# Patient Record
Sex: FEMALE | Race: WHITE | Employment: FULL TIME | URBAN - METROPOLITAN AREA
[De-identification: names, ages, dates, MRNs, and addresses within clinical notes are randomized per-mention and may not be internally consistent; named-entity substitution may affect disease eponyms.]

---

## 2020-05-09 ENCOUNTER — ED HISTORICAL/CONVERTED ENCOUNTER (OUTPATIENT)
Dept: OTHER | Age: 35
End: 2020-05-09

## 2021-03-05 LAB
ANTIBODY SCREEN, EXTERNAL: NEGATIVE
CHLAMYDIA, EXTERNAL: NEGATIVE
HBSAG, EXTERNAL: NEGATIVE
HIV, EXTERNAL: NEGATIVE
N. GONORRHEA, EXTERNAL: NEGATIVE
RPR, EXTERNAL: NORMAL
RUBELLA, EXTERNAL: NORMAL
TYPE, ABO & RH, EXTERNAL: NORMAL

## 2021-04-05 LAB — GTT, 1 HR, GLUCOLA, EXTERNAL: 145

## 2021-05-15 ENCOUNTER — HOSPITAL ENCOUNTER (EMERGENCY)
Age: 36
Discharge: HOME | End: 2021-05-15
Attending: EMERGENCY MEDICINE
Payer: MEDICAID

## 2021-05-15 ENCOUNTER — APPOINTMENT (OUTPATIENT)
Dept: ULTRASOUND IMAGING | Age: 36
End: 2021-05-15
Attending: EMERGENCY MEDICINE
Payer: MEDICAID

## 2021-05-15 ENCOUNTER — APPOINTMENT (OUTPATIENT)
Dept: GENERAL RADIOLOGY | Age: 36
End: 2021-05-15
Attending: EMERGENCY MEDICINE
Payer: MEDICAID

## 2021-05-15 VITALS
WEIGHT: 245 LBS | RESPIRATION RATE: 20 BRPM | DIASTOLIC BLOOD PRESSURE: 91 MMHG | HEART RATE: 82 BPM | SYSTOLIC BLOOD PRESSURE: 156 MMHG | HEIGHT: 65 IN | TEMPERATURE: 98.6 F | OXYGEN SATURATION: 100 % | BODY MASS INDEX: 40.82 KG/M2

## 2021-05-15 DIAGNOSIS — D72.829 LEUKOCYTOSIS, UNSPECIFIED TYPE: ICD-10-CM

## 2021-05-15 DIAGNOSIS — K52.9 GASTROENTERITIS: Primary | ICD-10-CM

## 2021-05-15 LAB
ALBUMIN SERPL-MCNC: 3.3 G/DL (ref 3.5–5)
ALBUMIN/GLOB SERPL: 0.7 {RATIO} (ref 1.1–2.2)
ALP SERPL-CCNC: 129 U/L (ref 45–117)
ALT SERPL-CCNC: 19 U/L (ref 12–78)
ANION GAP SERPL CALC-SCNC: 9 MMOL/L (ref 5–15)
APPEARANCE UR: ABNORMAL
AST SERPL W P-5'-P-CCNC: 12 U/L (ref 15–37)
BACTERIA URNS QL MICRO: NEGATIVE /HPF
BASOPHILS # BLD: 0.1 K/UL (ref 0–0.1)
BASOPHILS NFR BLD: 0 % (ref 0–1)
BILIRUB SERPL-MCNC: 0.2 MG/DL (ref 0.2–1)
BILIRUB UR QL: NEGATIVE
BUN SERPL-MCNC: 6 MG/DL (ref 6–20)
BUN/CREAT SERPL: 13 (ref 12–20)
CA-I BLD-MCNC: 9.5 MG/DL (ref 8.5–10.1)
CHLORIDE SERPL-SCNC: 103 MMOL/L (ref 97–108)
CO2 SERPL-SCNC: 22 MMOL/L (ref 21–32)
COLOR UR: ABNORMAL
CREAT SERPL-MCNC: 0.48 MG/DL (ref 0.55–1.02)
DIFFERENTIAL METHOD BLD: ABNORMAL
EOSINOPHIL # BLD: 0 K/UL (ref 0–0.4)
EOSINOPHIL NFR BLD: 0 % (ref 0–7)
ERYTHROCYTE [DISTWIDTH] IN BLOOD BY AUTOMATED COUNT: 13.6 % (ref 11.5–14.5)
GLOBULIN SER CALC-MCNC: 4.7 G/DL (ref 2–4)
GLUCOSE SERPL-MCNC: 122 MG/DL (ref 65–100)
GLUCOSE UR STRIP.AUTO-MCNC: NEGATIVE MG/DL
HCG SERPL-ACNC: 6691 MIU/ML (ref 0–6)
HCT VFR BLD AUTO: 38.3 % (ref 35–47)
HGB BLD-MCNC: 12.7 G/DL (ref 11.5–16)
HGB UR QL STRIP: ABNORMAL
IMM GRANULOCYTES # BLD AUTO: 0.2 K/UL (ref 0–0.04)
IMM GRANULOCYTES NFR BLD AUTO: 1 % (ref 0–0.5)
KETONES UR QL STRIP.AUTO: 20 MG/DL
LACTATE SERPL-SCNC: 1.1 MMOL/L (ref 0.4–2)
LEUKOCYTE ESTERASE UR QL STRIP.AUTO: NEGATIVE
LIPASE SERPL-CCNC: 62 U/L (ref 73–393)
LYMPHOCYTES # BLD: 2.1 K/UL (ref 0.8–3.5)
LYMPHOCYTES NFR BLD: 6 % (ref 12–49)
MAGNESIUM SERPL-MCNC: 1.5 MG/DL (ref 1.6–2.4)
MCH RBC QN AUTO: 29.7 PG (ref 26–34)
MCHC RBC AUTO-ENTMCNC: 33.2 G/DL (ref 30–36.5)
MCV RBC AUTO: 89.5 FL (ref 80–99)
MONOCYTES # BLD: 1.4 K/UL (ref 0–1)
MONOCYTES NFR BLD: 4 % (ref 5–13)
MUCOUS THREADS URNS QL MICRO: ABNORMAL /LPF
NEUTS SEG # BLD: 29.1 K/UL (ref 1.8–8)
NEUTS SEG NFR BLD: 89 % (ref 32–75)
NITRITE UR QL STRIP.AUTO: NEGATIVE
NRBC # BLD: 0 K/UL (ref 0–0.01)
NRBC BLD-RTO: 0 PER 100 WBC
PH UR STRIP: 5 [PH] (ref 5–8)
PLATELET # BLD AUTO: 416 K/UL (ref 150–400)
PMV BLD AUTO: 11 FL (ref 8.9–12.9)
POTASSIUM SERPL-SCNC: 3.6 MMOL/L (ref 3.5–5.1)
PROT SERPL-MCNC: 8 G/DL (ref 6.4–8.2)
PROT UR STRIP-MCNC: 100 MG/DL
RBC # BLD AUTO: 4.28 M/UL (ref 3.8–5.2)
RBC #/AREA URNS HPF: ABNORMAL /HPF (ref 0–5)
SODIUM SERPL-SCNC: 134 MMOL/L (ref 136–145)
SP GR UR REFRACTOMETRY: 1.03 (ref 1–1.03)
TROPONIN I SERPL-MCNC: <0.05 NG/ML
UROBILINOGEN UR QL STRIP.AUTO: 0.1 EU/DL (ref 0.1–1)
WBC # BLD AUTO: 32.9 K/UL (ref 3.6–11)
WBC URNS QL MICRO: ABNORMAL /HPF (ref 0–4)

## 2021-05-15 PROCEDURE — 96361 HYDRATE IV INFUSION ADD-ON: CPT

## 2021-05-15 PROCEDURE — 74011000258 HC RX REV CODE- 258: Performed by: EMERGENCY MEDICINE

## 2021-05-15 PROCEDURE — 93005 ELECTROCARDIOGRAM TRACING: CPT

## 2021-05-15 PROCEDURE — 74011250636 HC RX REV CODE- 250/636: Performed by: NURSE PRACTITIONER

## 2021-05-15 PROCEDURE — 36415 COLL VENOUS BLD VENIPUNCTURE: CPT

## 2021-05-15 PROCEDURE — 84702 CHORIONIC GONADOTROPIN TEST: CPT

## 2021-05-15 PROCEDURE — 71045 X-RAY EXAM CHEST 1 VIEW: CPT

## 2021-05-15 PROCEDURE — 74011250636 HC RX REV CODE- 250/636: Performed by: EMERGENCY MEDICINE

## 2021-05-15 PROCEDURE — 83690 ASSAY OF LIPASE: CPT

## 2021-05-15 PROCEDURE — 76815 OB US LIMITED FETUS(S): CPT

## 2021-05-15 PROCEDURE — 80053 COMPREHEN METABOLIC PANEL: CPT

## 2021-05-15 PROCEDURE — 84484 ASSAY OF TROPONIN QUANT: CPT

## 2021-05-15 PROCEDURE — 96375 TX/PRO/DX INJ NEW DRUG ADDON: CPT

## 2021-05-15 PROCEDURE — 81001 URINALYSIS AUTO W/SCOPE: CPT

## 2021-05-15 PROCEDURE — 74011250637 HC RX REV CODE- 250/637: Performed by: EMERGENCY MEDICINE

## 2021-05-15 PROCEDURE — 99285 EMERGENCY DEPT VISIT HI MDM: CPT

## 2021-05-15 PROCEDURE — 83735 ASSAY OF MAGNESIUM: CPT

## 2021-05-15 PROCEDURE — 85025 COMPLETE CBC W/AUTO DIFF WBC: CPT

## 2021-05-15 PROCEDURE — 87040 BLOOD CULTURE FOR BACTERIA: CPT

## 2021-05-15 PROCEDURE — 83605 ASSAY OF LACTIC ACID: CPT

## 2021-05-15 PROCEDURE — 96374 THER/PROPH/DIAG INJ IV PUSH: CPT

## 2021-05-15 RX ORDER — ACETAMINOPHEN 500 MG
1000 TABLET ORAL
Status: COMPLETED | OUTPATIENT
Start: 2021-05-15 | End: 2021-05-15

## 2021-05-15 RX ORDER — ACETAMINOPHEN 325 MG/1
650 TABLET ORAL ONCE
Status: COMPLETED | OUTPATIENT
Start: 2021-05-15 | End: 2021-05-15

## 2021-05-15 RX ORDER — ONDANSETRON 2 MG/ML
4 INJECTION INTRAMUSCULAR; INTRAVENOUS
Status: COMPLETED | OUTPATIENT
Start: 2021-05-15 | End: 2021-05-15

## 2021-05-15 RX ORDER — ONDANSETRON 4 MG/1
4 TABLET, ORALLY DISINTEGRATING ORAL
Qty: 12 TAB | Refills: 0 | Status: SHIPPED | OUTPATIENT
Start: 2021-05-15 | End: 2021-05-20

## 2021-05-15 RX ADMIN — ACETAMINOPHEN 650 MG: 325 TABLET, FILM COATED ORAL at 11:06

## 2021-05-15 RX ADMIN — SODIUM CHLORIDE 1000 ML: 9 INJECTION, SOLUTION INTRAVENOUS at 02:30

## 2021-05-15 RX ADMIN — ONDANSETRON 4 MG: 2 INJECTION INTRAMUSCULAR; INTRAVENOUS at 05:01

## 2021-05-15 RX ADMIN — ACETAMINOPHEN 1000 MG: 500 TABLET, FILM COATED ORAL at 05:01

## 2021-05-15 RX ADMIN — PIPERACILLIN AND TAZOBACTAM 3.38 G: 3; .375 INJECTION, POWDER, LYOPHILIZED, FOR SOLUTION INTRAVENOUS at 07:41

## 2021-05-15 NOTE — ED PROVIDER NOTES
EMERGENCY DEPARTMENT HISTORY AND PHYSICAL EXAM      Date: 5/15/2021  Patient Name: aTmara Colby      History of Presenting Illness     Chief Complaint   Patient presents with    Chest Pain    Vomiting    Diarrhea       History Provided By: Patient    HPI: Tamara Colby, 30 y.o. female with a past medical history significant No significant past medical history presents to the ED with cc of chest pain and upper back pain over the past 24 hours. Patient is approximately 19 weeks pregnant. Patient has nausea vomiting. No shortness of breath. No vaginal discharge or bleed. No abdominal pain. Patient also complains of mid back pain. She has said the pain gets worse upon movement. Patient denies any other complaints at this time. There are no other complaints, changes, or physical findings at this time. PCP: James Fill, DO    Current Outpatient Medications   Medication Sig Dispense Refill    ondansetron (Zofran ODT) 4 mg disintegrating tablet Take 1 Tab by mouth every eight (8) hours as needed for Nausea or Vomiting for up to 5 days. 12 Tab 0       Past History     Past Medical History:  No past medical history on file. Past Surgical History:  No past surgical history on file. Family History:  No family history on file. Social History:  Social History     Tobacco Use    Smoking status: Not on file   Substance Use Topics    Alcohol use: Not on file    Drug use: Not on file       Allergies: Allergies   Allergen Reactions    Sulfa (Sulfonamide Antibiotics) Not Reported This Time         Review of Systems     Review of Systems   Constitutional: Negative. HENT: Negative. Eyes: Negative. Respiratory: Negative. Cardiovascular: Positive for chest pain. Gastrointestinal: Negative. Endocrine: Negative. Genitourinary: Negative. Musculoskeletal: Negative. Skin: Negative. Neurological: Negative. Psychiatric/Behavioral: Negative.     All other systems reviewed and are negative. Physical Exam     Physical Exam  Vitals signs and nursing note reviewed. Constitutional:       General: She is not in acute distress. Appearance: Normal appearance. She is obese. She is not ill-appearing or diaphoretic. HENT:      Head: Normocephalic. Right Ear: External ear normal.      Left Ear: External ear normal.      Nose: Nose normal. No congestion or rhinorrhea. Mouth/Throat:      Pharynx: Oropharynx is clear. No oropharyngeal exudate or posterior oropharyngeal erythema. Eyes:      General: No scleral icterus. Right eye: No discharge. Left eye: No discharge. Extraocular Movements: Extraocular movements intact. Conjunctiva/sclera: Conjunctivae normal.      Pupils: Pupils are equal, round, and reactive to light. Neck:      Musculoskeletal: Normal range of motion and neck supple. No neck rigidity or muscular tenderness. Cardiovascular:      Rate and Rhythm: Normal rate and regular rhythm. Pulses: Normal pulses. Heart sounds: Normal heart sounds. No murmur. Pulmonary:      Effort: Pulmonary effort is normal. No respiratory distress. Breath sounds: Normal breath sounds. No stridor. No decreased breath sounds, wheezing, rhonchi or rales. Chest:      Chest wall: No tenderness. Comments: Moderate chest wall tenderness upon palpation at the lower chest wall anteriorly and over sternum. Abdominal:      General: Abdomen is flat. Bowel sounds are normal. There is no distension. Palpations: Abdomen is soft. Tenderness: There is no abdominal tenderness. There is no right CVA tenderness, left CVA tenderness, guarding or rebound. Comments: Gravid uterus   Musculoskeletal:         General: No swelling, tenderness, deformity or signs of injury. Right lower leg: No edema. Left lower leg: No edema. Comments: Positive tenderness mild muscle spasm upon palpation   Skin:     General: Skin is warm. Capillary Refill: Capillary refill takes less than 2 seconds. Coloration: Skin is not jaundiced or pale. Findings: No bruising, erythema, lesion or rash. Neurological:      General: No focal deficit present. Mental Status: She is alert and oriented to person, place, and time. Mental status is at baseline. Cranial Nerves: No cranial nerve deficit. Sensory: No sensory deficit. Motor: No weakness. Coordination: Coordination normal.   Psychiatric:         Mood and Affect: Mood normal.         Behavior: Behavior normal.         Thought Content: Thought content normal.         Judgment: Judgment normal.         Lab and Diagnostic Study Results     Labs -     No results found for this or any previous visit (from the past 12 hour(s)). Radiologic Studies -   [unfilled]  CT Results  (Last 48 hours)    None        CXR Results  (Last 48 hours)               05/15/21 0543  XR CHEST PORT Final result    Impression:  Borderline enlargement of cardiopericardial silhouette, magnified by   exam technique. No evidence of pulmonary edema, air space pneumonia, or pleural   effusion. No evidence of pneumothorax. Narrative:  Chest, portable, 0537 hours. Comparison with 9/1/2012. Medical Decision Making and ED Course   - I am the first and primary provider for this patient AND AM THE PRIMARY PROVIDER OF RECORD. - I reviewed the vital signs, available nursing notes, past medical history, past surgical history, family history and social history. - Initial assessment performed. The patients presenting problems have been discussed, and the staff are in agreement with the care plan formulated and outlined with them. I have encouraged them to ask questions as they arise throughout their visit. Vital Signs-Reviewed the patient's vital signs. No data found. EKG interpretation: (Preliminary):EKG was done at 2:06 AM.  Normal sinus rhythm. Rate of 82. Normal axis.   No acute ST-T elevation. No STEMI. No old EKG available for comparison. Records Reviewed: Nursing Notes        ED Course:       ED Course as of May 16 0722   Sat May 15, 2021   1128 I reevaluated the patient. She is feeling well. Ultrasound is unremarkable showing pregnancy at 20 weeks and 3 days. I spoke with the on-call OB/GYN doctor, Dr. Deya Bustos. She suspects likely gastroenteritis or gastritis. I feel it is safe to discharge patient home and feel that her leukocytosis is slightly out of proportion due to her pregnancy. Feel that CT scan of the abdomen and pelvis would do more harm than good. [SW]      ED Course User Index  [SW] Bernsal Collar, DO         Provider Notes (Medical Decision Making):     MDM  Number of Diagnoses or Management Options  Gastroenteritis: new, needed workup  Leukocytosis, unspecified type: new, needed workup  Diagnosis management comments: Differential diagnosis include discomfort of pregnancy, GERD, gastritis, peptic ulcer disease, hiatal hernia chest pain, musculoskeletal pain, pneumonia, pneumothorax. Amount and/or Complexity of Data Reviewed  Clinical lab tests: ordered and reviewed  Tests in the radiology section of CPT®: ordered and reviewed  Tests in the medicine section of CPT®: ordered and reviewed  Independent visualization of images, tracings, or specimens: yes (EKG was done at 2:06 AM.  Normal sinus rhythm. Rate of 82. Normal axis. No acute ST-T elevation. No STEMI. No old EKG available for comparison.)    Risk of Complications, Morbidity, and/or Mortality  Presenting problems: high  Diagnostic procedures: high  Management options: high  General comments: Patient remained stable throughout the course of treatment. Labs were unremarkable except for leukocytosis of 32,000. Ultrasound still is pending.   Case was discussed and transferred to Dr. Frankie Kidd, incoming ER physician at 9 AM.               Consultations:       Consultations:         Procedures and Critical Care       Performed by: Harsh Traylor DO  PROCEDURES:  Procedures               CRITICAL CARE NOTE :  4:52 AM  Amount of Critical Care Time: (minutes)    IMPENDING DETERIORATION -  ASSOCIATED RISK FACTORS -   MANAGEMENT-   INTERPRETATION -    INTERVENTIONS -   CASE REVIEW -   TREATMENT RESPONSE -  PERFORMED BY -     NOTES   :  I have spent critical care time involved in lab review, consultations with specialist, family decision- making, bedside attention and documentation. This time excludes time spent in any separate billed procedures. During this entire length of time I was immediately available to the patient . Harsh Traylor DO        Disposition     Disposition: Condition stable    Discharged    Remove if not discharged  DISCHARGE PLAN:  1. There are no discharge medications for this patient. 2.   Follow-up Information     Follow up With Specialties Details Why [address] [facility] EMERGENCY DEPT Emergency Medicine Go today As soon as possible if symptoms worsen [facility] [place] [id number]  [phone]    Your OB/GYN            3. Return to ED if worse   4. Discharge Medication List as of 5/15/2021 11:56 AM      START taking these medications    Details   ondansetron (Zofran ODT) 4 mg disintegrating tablet Take 1 Tab by mouth every eight (8) hours as needed for Nausea or Vomiting for up to 5 days. , Normal, Disp-12 Tab, R-0             Diagnosis     Clinical Impression:   1. Gastroenteritis    2. Leukocytosis, unspecified type        Attestations:    Harsh Traylor DO    Please note that this dictation was completed with [employer], the computer voice recognition software. Quite often unanticipated grammatical, syntax, homophones, and other interpretive errors are inadvertently transcribed by the computer software. Please disregard these errors. Please excuse any errors that have escaped final proofreading. Thank you.

## 2021-05-15 NOTE — ED NOTES
33yo female pt with significant PMH of being 19weeks pregnant presents to ED for nausea, emesis, diarrhea, oriented to room and call bell, cardiac and continuous spO2 monitoring initiated, IV started, blood samples and urine samples collected and sent to lab for analysis, plan of care reviewed, call bell in reach, side rails up x2, will continue to monitor.

## 2021-05-15 NOTE — ED TRIAGE NOTES
Pt 19 weeks pregnant. Reports back pain/chest pain that started this morning. Reports vomiting for approx 24 hours with diarrhea. +fetal movement. Denies abd cramping.

## 2021-05-16 LAB
ATRIAL RATE: 82 BPM
CALCULATED P AXIS, ECG09: 59 DEGREES
CALCULATED R AXIS, ECG10: 46 DEGREES
CALCULATED T AXIS, ECG11: 39 DEGREES
DIAGNOSIS, 93000: NORMAL
P-R INTERVAL, ECG05: 154 MS
Q-T INTERVAL, ECG07: 396 MS
QRS DURATION, ECG06: 84 MS
QTC CALCULATION (BEZET), ECG08: 462 MS
VENTRICULAR RATE, ECG03: 82 BPM

## 2021-05-19 LAB
GTT 120 MIN, EXTERNAL: 150
GTT 180 MIN, EXTERNAL: 48
GTT 60 MIN, EXTERNAL: 138
GTT, FASTING, EXTERNAL: 79

## 2021-05-21 LAB
BACTERIA SPEC CULT: NORMAL
SPECIAL REQUESTS,SREQ: NORMAL

## 2021-06-21 ENCOUNTER — HOSPITAL ENCOUNTER (OUTPATIENT)
Dept: PERINATAL CARE | Age: 36
Discharge: HOME | End: 2021-06-21
Attending: OBSTETRICS & GYNECOLOGY
Payer: MEDICAID

## 2021-06-21 PROCEDURE — 76811 OB US DETAILED SNGL FETUS: CPT | Performed by: OBSTETRICS & GYNECOLOGY

## 2021-09-03 LAB — GRBS, EXTERNAL: NEGATIVE

## 2021-09-17 ENCOUNTER — HOSPITAL ENCOUNTER (OUTPATIENT)
Dept: LAB | Age: 36
Discharge: HOME | DRG: 540 | End: 2021-09-17
Payer: MEDICAID

## 2021-09-17 ENCOUNTER — TRANSCRIBE ORDER (OUTPATIENT)
Dept: REGISTRATION | Age: 36
End: 2021-09-17

## 2021-09-17 DIAGNOSIS — Z01.812 ENCOUNTER FOR PREOPERATIVE SCREENING LABORATORY TESTING FOR COVID-19 VIRUS: Primary | ICD-10-CM

## 2021-09-17 DIAGNOSIS — Z20.822 ENCOUNTER FOR PREOPERATIVE SCREENING LABORATORY TESTING FOR COVID-19 VIRUS: ICD-10-CM

## 2021-09-17 DIAGNOSIS — Z01.812 ENCOUNTER FOR PREOPERATIVE SCREENING LABORATORY TESTING FOR COVID-19 VIRUS: ICD-10-CM

## 2021-09-17 DIAGNOSIS — Z20.822 ENCOUNTER FOR PREOPERATIVE SCREENING LABORATORY TESTING FOR COVID-19 VIRUS: Primary | ICD-10-CM

## 2021-09-17 PROCEDURE — U0003 INFECTIOUS AGENT DETECTION BY NUCLEIC ACID (DNA OR RNA); SEVERE ACUTE RESPIRATORY SYNDROME CORONAVIRUS 2 (SARS-COV-2) (CORONAVIRUS DISEASE [COVID-19]), AMPLIFIED PROBE TECHNIQUE, MAKING USE OF HIGH THROUGHPUT TECHNOLOGIES AS DESCRIBED BY CMS-2020-01-R: HCPCS

## 2021-09-19 ENCOUNTER — HOSPITAL ENCOUNTER (INPATIENT)
Age: 36
LOS: 4 days | Discharge: HOME | DRG: 540 | End: 2021-09-23
Attending: STUDENT IN AN ORGANIZED HEALTH CARE EDUCATION/TRAINING PROGRAM | Admitting: OBSTETRICS & GYNECOLOGY
Payer: MEDICAID

## 2021-09-19 DIAGNOSIS — G89.18 POSTOPERATIVE PAIN: Primary | ICD-10-CM

## 2021-09-19 PROBLEM — O43.193 MARGINAL INSERTION OF UMBILICAL CORD AFFECTING MANAGEMENT OF MOTHER IN THIRD TRIMESTER: Status: ACTIVE | Noted: 2021-09-19

## 2021-09-19 PROBLEM — Z34.90 PREGNANCY: Status: ACTIVE | Noted: 2021-09-19

## 2021-09-19 PROBLEM — O99.333 TOBACCO USE AFFECTING PREGNANCY IN THIRD TRIMESTER, ANTEPARTUM: Status: ACTIVE | Noted: 2021-09-19

## 2021-09-19 PROBLEM — F41.9 ANXIETY: Status: ACTIVE | Noted: 2021-09-19

## 2021-09-19 PROBLEM — O10.013 ESSENTIAL HYPERTENSION AFFECTING PREGNANCY IN THIRD TRIMESTER: Status: ACTIVE | Noted: 2021-09-19

## 2021-09-19 PROBLEM — F32.9 MAJOR DEPRESSIVE DISORDER: Status: ACTIVE | Noted: 2021-09-19

## 2021-09-19 LAB
ALBUMIN SERPL-MCNC: 2.6 G/DL (ref 3.5–5)
ALBUMIN/GLOB SERPL: 0.7 {RATIO} (ref 1.1–2.2)
ALP SERPL-CCNC: 144 U/L (ref 45–117)
ALT SERPL-CCNC: 18 U/L (ref 12–78)
ANION GAP SERPL CALC-SCNC: 8 MMOL/L (ref 5–15)
AST SERPL-CCNC: 13 U/L (ref 15–37)
BASOPHILS # BLD: 0.1 K/UL (ref 0–0.1)
BASOPHILS NFR BLD: 0 % (ref 0–1)
BILIRUB SERPL-MCNC: 0.2 MG/DL (ref 0.2–1)
BUN SERPL-MCNC: 8 MG/DL (ref 6–20)
BUN/CREAT SERPL: 15 (ref 12–20)
CALCIUM SERPL-MCNC: 8.8 MG/DL (ref 8.5–10.1)
CHLORIDE SERPL-SCNC: 108 MMOL/L (ref 97–108)
CO2 SERPL-SCNC: 21 MMOL/L (ref 21–32)
CREAT SERPL-MCNC: 0.52 MG/DL (ref 0.55–1.02)
CREAT UR-MCNC: 274 MG/DL
DIFFERENTIAL METHOD BLD: ABNORMAL
EOSINOPHIL # BLD: 0.4 K/UL (ref 0–0.4)
EOSINOPHIL NFR BLD: 2 % (ref 0–7)
ERYTHROCYTE [DISTWIDTH] IN BLOOD BY AUTOMATED COUNT: 14.8 % (ref 11.5–14.5)
GLOBULIN SER CALC-MCNC: 3.9 G/DL (ref 2–4)
GLUCOSE SERPL-MCNC: 103 MG/DL (ref 65–100)
HCT VFR BLD AUTO: 36 % (ref 35–47)
HGB BLD-MCNC: 11.8 G/DL (ref 11.5–16)
IMM GRANULOCYTES # BLD AUTO: 0.1 K/UL (ref 0–0.04)
IMM GRANULOCYTES NFR BLD AUTO: 1 % (ref 0–0.5)
LYMPHOCYTES # BLD: 2.8 K/UL (ref 0.8–3.5)
LYMPHOCYTES NFR BLD: 16 % (ref 12–49)
MCH RBC QN AUTO: 29.6 PG (ref 26–34)
MCHC RBC AUTO-ENTMCNC: 32.8 G/DL (ref 30–36.5)
MCV RBC AUTO: 90.2 FL (ref 80–99)
MONOCYTES # BLD: 1.2 K/UL (ref 0–1)
MONOCYTES NFR BLD: 7 % (ref 5–13)
NEUTS SEG # BLD: 13.6 K/UL (ref 1.8–8)
NEUTS SEG NFR BLD: 74 % (ref 32–75)
NRBC # BLD: 0 K/UL (ref 0–0.01)
NRBC BLD-RTO: 0 PER 100 WBC
PLATELET # BLD AUTO: 362 K/UL (ref 150–400)
PMV BLD AUTO: 11.6 FL (ref 8.9–12.9)
POTASSIUM SERPL-SCNC: 3.8 MMOL/L (ref 3.5–5.1)
PROT SERPL-MCNC: 6.5 G/DL (ref 6.4–8.2)
PROT UR-MCNC: 62 MG/DL (ref 0–11.9)
PROT/CREAT UR-RTO: 0.2
RBC # BLD AUTO: 3.99 M/UL (ref 3.8–5.2)
SARS-COV-2, XPLCVT: NOT DETECTED
SODIUM SERPL-SCNC: 137 MMOL/L (ref 136–145)
SOURCE, COVRS: NORMAL
WBC # BLD AUTO: 18.2 K/UL (ref 3.6–11)

## 2021-09-19 PROCEDURE — 59200 INSERT CERVICAL DILATOR: CPT | Performed by: STUDENT IN AN ORGANIZED HEALTH CARE EDUCATION/TRAINING PROGRAM

## 2021-09-19 PROCEDURE — 75410000002 HC LABOR FEE PER 1 HR: Performed by: STUDENT IN AN ORGANIZED HEALTH CARE EDUCATION/TRAINING PROGRAM

## 2021-09-19 PROCEDURE — 84156 ASSAY OF PROTEIN URINE: CPT

## 2021-09-19 PROCEDURE — 80053 COMPREHEN METABOLIC PANEL: CPT

## 2021-09-19 PROCEDURE — 36415 COLL VENOUS BLD VENIPUNCTURE: CPT

## 2021-09-19 PROCEDURE — 74011250637 HC RX REV CODE- 250/637: Performed by: OBSTETRICS & GYNECOLOGY

## 2021-09-19 PROCEDURE — 65270000029 HC RM PRIVATE

## 2021-09-19 PROCEDURE — 85025 COMPLETE CBC W/AUTO DIFF WBC: CPT

## 2021-09-19 RX ORDER — LABETALOL 200 MG/1
200 TABLET, FILM COATED ORAL DAILY
COMMUNITY

## 2021-09-19 RX ORDER — NALBUPHINE HYDROCHLORIDE 10 MG/ML
10 INJECTION, SOLUTION INTRAMUSCULAR; INTRAVENOUS; SUBCUTANEOUS
Status: DISCONTINUED | OUTPATIENT
Start: 2021-09-19 | End: 2021-09-23 | Stop reason: HOSPADM

## 2021-09-19 RX ORDER — SODIUM CHLORIDE, SODIUM LACTATE, POTASSIUM CHLORIDE, CALCIUM CHLORIDE 600; 310; 30; 20 MG/100ML; MG/100ML; MG/100ML; MG/100ML
125 INJECTION, SOLUTION INTRAVENOUS CONTINUOUS
Status: DISCONTINUED | OUTPATIENT
Start: 2021-09-20 | End: 2021-09-20

## 2021-09-19 RX ORDER — LABETALOL 200 MG/1
200 TABLET, FILM COATED ORAL DAILY
Status: DISCONTINUED | OUTPATIENT
Start: 2021-09-20 | End: 2021-09-23 | Stop reason: HOSPADM

## 2021-09-19 RX ORDER — OXYTOCIN/RINGER'S LACTATE 30/500 ML
0-20 PLASTIC BAG, INJECTION (ML) INTRAVENOUS
Status: DISCONTINUED | OUTPATIENT
Start: 2021-09-20 | End: 2021-09-23 | Stop reason: HOSPADM

## 2021-09-19 RX ORDER — OXYTOCIN/RINGER'S LACTATE 30/500 ML
10 PLASTIC BAG, INJECTION (ML) INTRAVENOUS AS NEEDED
Status: DISCONTINUED | OUTPATIENT
Start: 2021-09-19 | End: 2021-09-20 | Stop reason: HOSPADM

## 2021-09-19 RX ORDER — OXYTOCIN/RINGER'S LACTATE 30/500 ML
87.3 PLASTIC BAG, INJECTION (ML) INTRAVENOUS AS NEEDED
Status: DISCONTINUED | OUTPATIENT
Start: 2021-09-19 | End: 2021-09-20 | Stop reason: HOSPADM

## 2021-09-19 RX ORDER — SODIUM CHLORIDE 0.9 % (FLUSH) 0.9 %
5-40 SYRINGE (ML) INJECTION EVERY 8 HOURS
Status: DISCONTINUED | OUTPATIENT
Start: 2021-09-19 | End: 2021-09-19

## 2021-09-19 RX ORDER — SODIUM CHLORIDE 0.9 % (FLUSH) 0.9 %
5-40 SYRINGE (ML) INJECTION AS NEEDED
Status: DISCONTINUED | OUTPATIENT
Start: 2021-09-19 | End: 2021-09-20

## 2021-09-19 RX ORDER — ASPIRIN 81 MG/1
81 TABLET ORAL DAILY
COMMUNITY
End: 2022-02-25

## 2021-09-19 RX ORDER — ZOLPIDEM TARTRATE 5 MG/1
5 TABLET ORAL
Status: DISCONTINUED | OUTPATIENT
Start: 2021-09-19 | End: 2021-09-23 | Stop reason: HOSPADM

## 2021-09-19 RX ADMIN — MISOPROSTOL 25 MCG: 100 TABLET ORAL at 20:11

## 2021-09-19 NOTE — H&P
History & Physical    Name: Horace Han MRN: [id number]  SSN: [id number]    YOB: 1985  Age: 29 y.o. Sex: female      Subjective:     Estimated Date of Delivery: 10/5/21  OB History    Para Term  AB Living   5 2     2 2   SAB TAB Ectopic Molar Multiple Live Births   2                # Outcome Date GA Lbr Francisco/2nd Weight Sex Delivery Anes PTL Lv   5 Current            4 SAB            3 SAB            2 Para            1 Para                Ms. Ashley Geller is a G5   with pregnancy at 37w5d for induction of labor due to chronic hypertension requiring medication. Denies contractions, leakage of vaginal fluid, vaginal bleeding, headache, vision changes, nausea, vomiting, ruq pain, and epigastric pain. Prenatal course is complicated by essential hypertension, abnormal 1 hr gtt with a normal 3 hr gtt, obesity with BMI 42, depression, and anxiety. Please see prenatal records for details. Past Medical History:   Diagnosis Date    Anxiety     Asthma     Bronchitis     Depression     Epilepsy ([facility] [address]     Essential hypertension affecting pregnancy in third trimester 2021    Obesity     Postpartum depression      GynHx: denies STIs, denies HSV    Past Surgical History:   Procedure Laterality Date    HX OTHER SURGICAL      wisdom teeth, D&C, tonsillectomy     Social History     Occupational History    Not on file   Tobacco Use    Smoking status: Current Every Day Smoker     Packs/day: 0.50     Types: Cigarettes    Smokeless tobacco: Never Used   Vaping Use    Vaping Use: Never used   Substance and Sexual Activity    Alcohol use: Not Currently    Drug use: Never    Sexual activity: Not on file     FH: mother- HTN,     Allergies   Allergen Reactions    Sulfa (Sulfonamide Antibiotics) Not Reported This Time     Prior to Admission medications    Medication Sig Start Date End Date Taking? Authorizing Provider   aspirin delayed-release 81 mg tablet Take 81 mg by mouth daily. Yes Provider, Historical   sertraline HCl (ZOLOFT PO) Take  by mouth. Yes Provider, Historical   labetaloL (NORMODYNE) 200 mg tablet Take 200 mg by mouth daily. Yes Provider, Historical        Review of Systems: A comprehensive review of systems was negative except for that written in the History of Present Illness. Objective:     Vitals:  Vitals:    09/19/21 1608 09/19/21 1645 09/19/21 1650 09/19/21 1651   BP:    121/71   Pulse:    87   Resp:    20   Temp:    98.6 °F (37 °C)   SpO2:  98% 98% 94%   Weight: 117 kg (258 lb)      Height: 5' 5\" (1.651 m)           Physical Exam:  Patient without distress.   Heart: Regular rate and rhythm or S1S2 present  Lung: clear to auscultation throughout lung fields, no wheezes, no rales, no rhonchi and normal respiratory effort  Abdomen: soft, nontender, gravid, EFW 7#  Fundus: soft and non tender  Perineum: blood absent, amniotic fluid absent  SSE: cook catheter inserted   Cervical Exam: closed/long/-3 vtx, posterior, adequate pelvis  Lower Extremities:  - Edema trace, DTR 2+  Membranes:  Intact  Fetal Heart Rate: Baseline: 135 per minute  Variability: moderate  Accelerations: yes  Decelerations: none  Uterine contractions: none     TAUS: vertex presentation       Prenatal Labs:   Lab Results   Component Value Date/Time    Rubella, External Immune 03/05/2021 12:00 AM    HBsAg, External Negative 03/05/2021 12:00 AM    HIV, External Negative 03/05/2021 12:00 AM    RPR, External Non-reactive 03/05/2021 12:00 AM    Gonorrhea, External Negative 03/05/2021 12:00 AM    Chlamydia, External Negative 03/05/2021 12:00 AM    ABO,Rh A Positive 03/05/2021 12:00 AM    GrBStrep, External Negative 09/03/2021 12:00 AM       Impression/Plan:     Active Problems:    Pregnancy (9/19/2021)      Major depressive disorder (9/19/2021)      Anxiety (9/19/2021)      Obesity complicating childbirth (9/19/2021)      Tobacco use affecting pregnancy in third trimester, antepartum (9/19/2021) Essential hypertension affecting pregnancy in third trimester (9/19/2021)      Marginal insertion of umbilical cord affecting management of mother in third trimester (9/19/2021)      Obesity ()         Plan: Admit for induction of labor for chronic hypertension requiring medication. Prior to placement of Cook catheter indications and risks were discussed with the patient. Sherill Alberta catheter inserted and inflated with 80 mL uterine and 80 mL vaginal.  She tolerated with procedure well. Cytotec 25 mcg po q 2 hrs x 4 doses. Pitocin to be started at 0600. Labetalol 200 mg po daily.

## 2021-09-19 NOTE — PROGRESS NOTES
66 Pt arrives to L&D  IOL for CHTN. Pt denies any other problems with the pregnancy. Pt oriented to room. Call bell within reach.     200 Dr Rachel Godfreys at bedside to evaluate pt.     1915 Bedside report given to Jorden Cervantes RN

## 2021-09-19 NOTE — PROGRESS NOTES
1900: This RN assuming care of pt at this time. Emmanuelle CHAMBERS placing saxena bulb, 80/80 cc inflated, pt tolerated well. Verbal orders for miso q2 received to be started 2000.    1915: SBAR report received from Jose A Simon. POC discussed with pt regarding birth preferences/pain management. Pt denies any questions or concerns, call bell within reach. 2000: 25 mcg miso given at this time (see MAR)    0014: pt given nubain at this time (see MAR)    0200: 25 mcg miso given at this time     0210: Dr Jasper Styles notified regarding /90. Verbal orders to begin nifedipine protocol. 0226: pt given 10mg PO nifedipine at this time. 0249: repeat /72    0255: Pt given 20mg PO nifedipine at this time. 4426: Repeat /56    0342: Pt sleeping supine/semi fowlers, audibly snoring, Pt given 2L O2 via nasal cannula. 1255: Dr Jasper Styles notified regarding /84. BP 30 minutes prior 129/61. Pt remaining at rest.  MD stating to retake BP in 15 minutes. 4332: Repeat /63    0630: Pitocin started at this time. 0710: SBAR/Shift change report given to Theresa Rodriguez RN.

## 2021-09-20 ENCOUNTER — ANESTHESIA (OUTPATIENT)
Dept: LABOR AND DELIVERY | Age: 36
DRG: 540 | End: 2021-09-20
Payer: MEDICAID

## 2021-09-20 ENCOUNTER — ANESTHESIA EVENT (OUTPATIENT)
Dept: LABOR AND DELIVERY | Age: 36
DRG: 540 | End: 2021-09-20
Payer: MEDICAID

## 2021-09-20 PROCEDURE — 74011250636 HC RX REV CODE- 250/636: Performed by: STUDENT IN AN ORGANIZED HEALTH CARE EDUCATION/TRAINING PROGRAM

## 2021-09-20 PROCEDURE — 77030005513 HC CATH URETH FOL11 MDII -B

## 2021-09-20 PROCEDURE — 75410000003 HC RECOV DEL/VAG/CSECN EA 0.5 HR: Performed by: STUDENT IN AN ORGANIZED HEALTH CARE EDUCATION/TRAINING PROGRAM

## 2021-09-20 PROCEDURE — 77010026065 HC OXYGEN MINIMUM MEDICAL AIR: Performed by: STUDENT IN AN ORGANIZED HEALTH CARE EDUCATION/TRAINING PROGRAM

## 2021-09-20 PROCEDURE — 77030018809 HC RETRCTR ALXSO DISP AMR -B

## 2021-09-20 PROCEDURE — 2709999900 HC NON-CHARGEABLE SUPPLY

## 2021-09-20 PROCEDURE — 76010000392 HC C SECN EA ADDL 0.5 HR: Performed by: STUDENT IN AN ORGANIZED HEALTH CARE EDUCATION/TRAINING PROGRAM

## 2021-09-20 PROCEDURE — 74011000258 HC RX REV CODE- 258: Performed by: NURSE ANESTHETIST, CERTIFIED REGISTERED

## 2021-09-20 PROCEDURE — 76010000391 HC C SECN FIRST 1 HR: Performed by: STUDENT IN AN ORGANIZED HEALTH CARE EDUCATION/TRAINING PROGRAM

## 2021-09-20 PROCEDURE — 74011250636 HC RX REV CODE- 250/636: Performed by: ANESTHESIOLOGY

## 2021-09-20 PROCEDURE — 65270000029 HC RM PRIVATE

## 2021-09-20 PROCEDURE — 74011250637 HC RX REV CODE- 250/637: Performed by: OBSTETRICS & GYNECOLOGY

## 2021-09-20 PROCEDURE — 74011250636 HC RX REV CODE- 250/636: Performed by: OBSTETRICS & GYNECOLOGY

## 2021-09-20 PROCEDURE — 74011250636 HC RX REV CODE- 250/636

## 2021-09-20 PROCEDURE — 76060000078 HC EPIDURAL ANESTHESIA: Performed by: STUDENT IN AN ORGANIZED HEALTH CARE EDUCATION/TRAINING PROGRAM

## 2021-09-20 PROCEDURE — 74011250636 HC RX REV CODE- 250/636: Performed by: NURSE ANESTHETIST, CERTIFIED REGISTERED

## 2021-09-20 PROCEDURE — 74011000250 HC RX REV CODE- 250: Performed by: ANESTHESIOLOGY

## 2021-09-20 PROCEDURE — 75410000002 HC LABOR FEE PER 1 HR: Performed by: STUDENT IN AN ORGANIZED HEALTH CARE EDUCATION/TRAINING PROGRAM

## 2021-09-20 PROCEDURE — 77030040361 HC SLV COMPR DVT MDII -B

## 2021-09-20 PROCEDURE — 77010026064 HC OXYGEN INFANT MED AIR MIN: Performed by: STUDENT IN AN ORGANIZED HEALTH CARE EDUCATION/TRAINING PROGRAM

## 2021-09-20 PROCEDURE — 77030014125 HC TY EPDRL BBMI -B: Performed by: ANESTHESIOLOGY

## 2021-09-20 PROCEDURE — 77030040179 HC DEV DRSG WND PICO S&N -C

## 2021-09-20 PROCEDURE — 74011000250 HC RX REV CODE- 250

## 2021-09-20 PROCEDURE — 74011000250 HC RX REV CODE- 250: Performed by: NURSE ANESTHETIST, CERTIFIED REGISTERED

## 2021-09-20 RX ORDER — BUPIVACAINE HYDROCHLORIDE 2.5 MG/ML
INJECTION, SOLUTION EPIDURAL; INFILTRATION; INTRACAUDAL AS NEEDED
Status: DISCONTINUED | OUTPATIENT
Start: 2021-09-20 | End: 2021-09-20 | Stop reason: HOSPADM

## 2021-09-20 RX ORDER — DOCUSATE SODIUM 100 MG/1
100 CAPSULE, LIQUID FILLED ORAL 2 TIMES DAILY
Status: DISCONTINUED | OUTPATIENT
Start: 2021-09-20 | End: 2021-09-23 | Stop reason: HOSPADM

## 2021-09-20 RX ORDER — SODIUM CHLORIDE, SODIUM LACTATE, POTASSIUM CHLORIDE, CALCIUM CHLORIDE 600; 310; 30; 20 MG/100ML; MG/100ML; MG/100ML; MG/100ML
125 INJECTION, SOLUTION INTRAVENOUS CONTINUOUS
Status: DISCONTINUED | OUTPATIENT
Start: 2021-09-20 | End: 2021-09-20

## 2021-09-20 RX ORDER — NIFEDIPINE 10 MG/1
10 CAPSULE ORAL
Status: COMPLETED | OUTPATIENT
Start: 2021-09-20 | End: 2021-09-20

## 2021-09-20 RX ORDER — EPHEDRINE SULFATE/0.9% NACL/PF 50 MG/5 ML
10 SYRINGE (ML) INTRAVENOUS
Status: DISCONTINUED | OUTPATIENT
Start: 2021-09-20 | End: 2021-09-20 | Stop reason: HOSPADM

## 2021-09-20 RX ORDER — SODIUM CHLORIDE, SODIUM LACTATE, POTASSIUM CHLORIDE, CALCIUM CHLORIDE 600; 310; 30; 20 MG/100ML; MG/100ML; MG/100ML; MG/100ML
125 INJECTION, SOLUTION INTRAVENOUS CONTINUOUS
Status: DISCONTINUED | OUTPATIENT
Start: 2021-09-20 | End: 2021-09-23

## 2021-09-20 RX ORDER — CHLOROPROCAINE HYDROCHLORIDE 30 MG/ML
INJECTION, SOLUTION EPIDURAL; INFILTRATION; INTRACAUDAL; PERINEURAL AS NEEDED
Status: DISCONTINUED | OUTPATIENT
Start: 2021-09-20 | End: 2021-09-20 | Stop reason: HOSPADM

## 2021-09-20 RX ORDER — FENTANYL/BUPIVACAINE/NS/PF 2-1250MCG
1-16 PREFILLED PUMP RESERVOIR EPIDURAL CONTINUOUS
Status: DISCONTINUED | OUTPATIENT
Start: 2021-09-20 | End: 2021-09-20 | Stop reason: HOSPADM

## 2021-09-20 RX ORDER — CEFAZOLIN SODIUM 1 G/3ML
INJECTION, POWDER, FOR SOLUTION INTRAMUSCULAR; INTRAVENOUS
Status: COMPLETED
Start: 2021-09-20 | End: 2021-09-20

## 2021-09-20 RX ORDER — SIMETHICONE 80 MG
80 TABLET,CHEWABLE ORAL AS NEEDED
Status: DISCONTINUED | OUTPATIENT
Start: 2021-09-20 | End: 2021-09-23 | Stop reason: HOSPADM

## 2021-09-20 RX ORDER — HYDRALAZINE HYDROCHLORIDE 20 MG/ML
10 INJECTION INTRAMUSCULAR; INTRAVENOUS ONCE
Status: ACTIVE | OUTPATIENT
Start: 2021-09-20 | End: 2021-09-20

## 2021-09-20 RX ORDER — CEFAZOLIN SODIUM 1 G/3ML
INJECTION, POWDER, FOR SOLUTION INTRAMUSCULAR; INTRAVENOUS AS NEEDED
Status: DISCONTINUED | OUTPATIENT
Start: 2021-09-20 | End: 2021-09-20 | Stop reason: HOSPADM

## 2021-09-20 RX ORDER — OXYTOCIN/RINGER'S LACTATE 30/500 ML
87.3 PLASTIC BAG, INJECTION (ML) INTRAVENOUS AS NEEDED
Status: DISCONTINUED | OUTPATIENT
Start: 2021-09-20 | End: 2021-09-23 | Stop reason: HOSPADM

## 2021-09-20 RX ORDER — KETOROLAC TROMETHAMINE 30 MG/ML
30 INJECTION, SOLUTION INTRAMUSCULAR; INTRAVENOUS
Status: DISPENSED | OUTPATIENT
Start: 2021-09-20 | End: 2021-09-21

## 2021-09-20 RX ORDER — MAGNESIUM SULFATE HEPTAHYDRATE 40 MG/ML
4 INJECTION, SOLUTION INTRAVENOUS ONCE
Status: COMPLETED | OUTPATIENT
Start: 2021-09-20 | End: 2021-09-20

## 2021-09-20 RX ORDER — HYDRALAZINE HYDROCHLORIDE 20 MG/ML
INJECTION INTRAMUSCULAR; INTRAVENOUS
Status: DISPENSED
Start: 2021-09-20 | End: 2021-09-20

## 2021-09-20 RX ORDER — LIDOCAINE HYDROCHLORIDE AND EPINEPHRINE 15; 5 MG/ML; UG/ML
INJECTION, SOLUTION EPIDURAL AS NEEDED
Status: DISCONTINUED | OUTPATIENT
Start: 2021-09-20 | End: 2021-09-20 | Stop reason: HOSPADM

## 2021-09-20 RX ORDER — EPHEDRINE SULFATE/0.9% NACL/PF 50 MG/5 ML
SYRINGE (ML) INTRAVENOUS AS NEEDED
Status: DISCONTINUED | OUTPATIENT
Start: 2021-09-20 | End: 2021-09-20 | Stop reason: HOSPADM

## 2021-09-20 RX ORDER — MORPHINE SULFATE 0.5 MG/ML
INJECTION, SOLUTION EPIDURAL; INTRATHECAL; INTRAVENOUS AS NEEDED
Status: DISCONTINUED | OUTPATIENT
Start: 2021-09-20 | End: 2021-09-20 | Stop reason: HOSPADM

## 2021-09-20 RX ORDER — WATER FOR INJECTION,STERILE
VIAL (ML) INJECTION
Status: DISPENSED
Start: 2021-09-20 | End: 2021-09-20

## 2021-09-20 RX ORDER — NALOXONE HYDROCHLORIDE 0.4 MG/ML
0.4 INJECTION, SOLUTION INTRAMUSCULAR; INTRAVENOUS; SUBCUTANEOUS AS NEEDED
Status: DISCONTINUED | OUTPATIENT
Start: 2021-09-20 | End: 2021-09-23 | Stop reason: HOSPADM

## 2021-09-20 RX ORDER — NIFEDIPINE 10 MG/1
20 CAPSULE ORAL
Status: DISCONTINUED | OUTPATIENT
Start: 2021-09-20 | End: 2021-09-23 | Stop reason: HOSPADM

## 2021-09-20 RX ORDER — SODIUM CHLORIDE, SODIUM LACTATE, POTASSIUM CHLORIDE, CALCIUM CHLORIDE 600; 310; 30; 20 MG/100ML; MG/100ML; MG/100ML; MG/100ML
125 INJECTION, SOLUTION INTRAVENOUS CONTINUOUS
Status: DISCONTINUED | OUTPATIENT
Start: 2021-09-20 | End: 2021-09-20 | Stop reason: HOSPADM

## 2021-09-20 RX ORDER — OXYCODONE AND ACETAMINOPHEN 5; 325 MG/1; MG/1
1 TABLET ORAL
Status: DISCONTINUED | OUTPATIENT
Start: 2021-09-20 | End: 2021-09-23 | Stop reason: HOSPADM

## 2021-09-20 RX ORDER — NIFEDIPINE 10 MG/1
CAPSULE ORAL
Status: DISPENSED
Start: 2021-09-20 | End: 2021-09-20

## 2021-09-20 RX ORDER — SODIUM CHLORIDE, SODIUM LACTATE, POTASSIUM CHLORIDE, CALCIUM CHLORIDE 600; 310; 30; 20 MG/100ML; MG/100ML; MG/100ML; MG/100ML
INJECTION, SOLUTION INTRAVENOUS
Status: DISCONTINUED | OUTPATIENT
Start: 2021-09-20 | End: 2021-09-20 | Stop reason: HOSPADM

## 2021-09-20 RX ORDER — DEXAMETHASONE SODIUM PHOSPHATE 4 MG/ML
INJECTION, SOLUTION INTRA-ARTICULAR; INTRALESIONAL; INTRAMUSCULAR; INTRAVENOUS; SOFT TISSUE AS NEEDED
Status: DISCONTINUED | OUTPATIENT
Start: 2021-09-20 | End: 2021-09-20 | Stop reason: HOSPADM

## 2021-09-20 RX ORDER — IBUPROFEN 800 MG/1
800 TABLET ORAL EVERY 8 HOURS
Status: DISCONTINUED | OUTPATIENT
Start: 2021-09-20 | End: 2021-09-23 | Stop reason: HOSPADM

## 2021-09-20 RX ORDER — MAGNESIUM SULFATE HEPTAHYDRATE 40 MG/ML
2 INJECTION, SOLUTION INTRAVENOUS CONTINUOUS
Status: DISCONTINUED | OUTPATIENT
Start: 2021-09-20 | End: 2021-09-23

## 2021-09-20 RX ORDER — KETOROLAC TROMETHAMINE 30 MG/ML
INJECTION, SOLUTION INTRAMUSCULAR; INTRAVENOUS AS NEEDED
Status: DISCONTINUED | OUTPATIENT
Start: 2021-09-20 | End: 2021-09-20 | Stop reason: HOSPADM

## 2021-09-20 RX ORDER — SODIUM CHLORIDE 0.9 % (FLUSH) 0.9 %
5-40 SYRINGE (ML) INJECTION AS NEEDED
Status: DISCONTINUED | OUTPATIENT
Start: 2021-09-20 | End: 2021-09-23

## 2021-09-20 RX ORDER — SODIUM CHLORIDE 0.9 % (FLUSH) 0.9 %
5-40 SYRINGE (ML) INJECTION EVERY 8 HOURS
Status: DISCONTINUED | OUTPATIENT
Start: 2021-09-20 | End: 2021-09-20

## 2021-09-20 RX ORDER — LABETALOL HCL 20 MG/4 ML
20 SYRINGE (ML) INTRAVENOUS
Status: ACTIVE | OUTPATIENT
Start: 2021-09-20 | End: 2021-09-21

## 2021-09-20 RX ORDER — ONDANSETRON 2 MG/ML
INJECTION INTRAMUSCULAR; INTRAVENOUS AS NEEDED
Status: DISCONTINUED | OUTPATIENT
Start: 2021-09-20 | End: 2021-09-20 | Stop reason: HOSPADM

## 2021-09-20 RX ORDER — ONDANSETRON 2 MG/ML
4 INJECTION INTRAMUSCULAR; INTRAVENOUS
Status: DISCONTINUED | OUTPATIENT
Start: 2021-09-20 | End: 2021-09-23 | Stop reason: HOSPADM

## 2021-09-20 RX ORDER — OXYTOCIN 10 [USP'U]/ML
INJECTION, SOLUTION INTRAMUSCULAR; INTRAVENOUS AS NEEDED
Status: DISCONTINUED | OUTPATIENT
Start: 2021-09-20 | End: 2021-09-20 | Stop reason: HOSPADM

## 2021-09-20 RX ORDER — OXYTOCIN/RINGER'S LACTATE 30/500 ML
10 PLASTIC BAG, INJECTION (ML) INTRAVENOUS AS NEEDED
Status: DISCONTINUED | OUTPATIENT
Start: 2021-09-20 | End: 2021-09-23 | Stop reason: HOSPADM

## 2021-09-20 RX ORDER — DIPHENHYDRAMINE HYDROCHLORIDE 50 MG/ML
12.5 INJECTION, SOLUTION INTRAMUSCULAR; INTRAVENOUS
Status: DISCONTINUED | OUTPATIENT
Start: 2021-09-20 | End: 2021-09-23 | Stop reason: HOSPADM

## 2021-09-20 RX ORDER — SODIUM CHLORIDE 0.9 % (FLUSH) 0.9 %
5-40 SYRINGE (ML) INJECTION EVERY 8 HOURS
Status: DISCONTINUED | OUTPATIENT
Start: 2021-09-20 | End: 2021-09-23

## 2021-09-20 RX ORDER — SODIUM CHLORIDE 9 MG/ML
150 INJECTION, SOLUTION INTRAVENOUS CONTINUOUS
Status: DISCONTINUED | OUTPATIENT
Start: 2021-09-20 | End: 2021-09-23

## 2021-09-20 RX ADMIN — MISOPROSTOL 25 MCG: 100 TABLET ORAL at 02:00

## 2021-09-20 RX ADMIN — MORPHINE SULFATE 3 MG: 0.5 INJECTION, SOLUTION EPIDURAL; INTRATHECAL; INTRAVENOUS at 12:18

## 2021-09-20 RX ADMIN — NALBUPHINE HYDROCHLORIDE 10 MG: 10 INJECTION, SOLUTION INTRAMUSCULAR; INTRAVENOUS; SUBCUTANEOUS at 00:14

## 2021-09-20 RX ADMIN — SODIUM CHLORIDE 100 MCG: 9 INJECTION INTRAMUSCULAR; INTRAVENOUS; SUBCUTANEOUS at 11:43

## 2021-09-20 RX ADMIN — SODIUM CHLORIDE, POTASSIUM CHLORIDE, SODIUM LACTATE AND CALCIUM CHLORIDE 75 ML/HR: 600; 310; 30; 20 INJECTION, SOLUTION INTRAVENOUS at 10:43

## 2021-09-20 RX ADMIN — Medication 10 ML/HR: at 10:30

## 2021-09-20 RX ADMIN — NIFEDIPINE 10 MG: 10 CAPSULE ORAL at 02:26

## 2021-09-20 RX ADMIN — NALBUPHINE HYDROCHLORIDE 10 MG: 10 INJECTION, SOLUTION INTRAMUSCULAR; INTRAVENOUS; SUBCUTANEOUS at 04:55

## 2021-09-20 RX ADMIN — OXYTOCIN 2 MILLI-UNITS/MIN: 10 INJECTION, SOLUTION INTRAMUSCULAR; INTRAVENOUS at 06:30

## 2021-09-20 RX ADMIN — SODIUM CHLORIDE, POTASSIUM CHLORIDE, SODIUM LACTATE AND CALCIUM CHLORIDE 125 ML/HR: 600; 310; 30; 20 INJECTION, SOLUTION INTRAVENOUS at 06:30

## 2021-09-20 RX ADMIN — MAGNESIUM SULFATE HEPTAHYDRATE 2 G/HR: 40 INJECTION, SOLUTION INTRAVENOUS at 18:21

## 2021-09-20 RX ADMIN — SODIUM CHLORIDE, POTASSIUM CHLORIDE, SODIUM LACTATE AND CALCIUM CHLORIDE 75 ML/HR: 600; 310; 30; 20 INJECTION, SOLUTION INTRAVENOUS at 09:04

## 2021-09-20 RX ADMIN — SODIUM CHLORIDE 100 MCG: 9 INJECTION INTRAMUSCULAR; INTRAVENOUS; SUBCUTANEOUS at 12:28

## 2021-09-20 RX ADMIN — Medication 10 MG: at 11:28

## 2021-09-20 RX ADMIN — DEXAMETHASONE SODIUM PHOSPHATE 8 MG: 4 INJECTION, SOLUTION INTRAMUSCULAR; INTRAVENOUS at 11:55

## 2021-09-20 RX ADMIN — MAGNESIUM SULFATE HEPTAHYDRATE 2 G/HR: 40 INJECTION, SOLUTION INTRAVENOUS at 08:25

## 2021-09-20 RX ADMIN — Medication 10 MG: at 12:21

## 2021-09-20 RX ADMIN — Medication 10 MG: at 10:51

## 2021-09-20 RX ADMIN — SODIUM CHLORIDE, POTASSIUM CHLORIDE, SODIUM LACTATE AND CALCIUM CHLORIDE 1000 ML: 600; 310; 30; 20 INJECTION, SOLUTION INTRAVENOUS at 09:45

## 2021-09-20 RX ADMIN — CHLOROPROCAINE HYDROCHLORIDE 7 ML: 30 INJECTION, SOLUTION EPIDURAL; INFILTRATION; INTRACAUDAL; PERINEURAL at 11:38

## 2021-09-20 RX ADMIN — NIFEDIPINE 20 MG: 10 CAPSULE ORAL at 02:55

## 2021-09-20 RX ADMIN — SODIUM CHLORIDE, POTASSIUM CHLORIDE, SODIUM LACTATE AND CALCIUM CHLORIDE 125 ML/HR: 600; 310; 30; 20 INJECTION, SOLUTION INTRAVENOUS at 15:07

## 2021-09-20 RX ADMIN — SODIUM CHLORIDE, POTASSIUM CHLORIDE, SODIUM LACTATE AND CALCIUM CHLORIDE: 600; 310; 30; 20 INJECTION, SOLUTION INTRAVENOUS at 12:03

## 2021-09-20 RX ADMIN — SODIUM CHLORIDE, POTASSIUM CHLORIDE, SODIUM LACTATE AND CALCIUM CHLORIDE: 600; 310; 30; 20 INJECTION, SOLUTION INTRAVENOUS at 11:34

## 2021-09-20 RX ADMIN — LABETALOL HYDROCHLORIDE 200 MG: 200 TABLET, FILM COATED ORAL at 07:40

## 2021-09-20 RX ADMIN — KETOROLAC TROMETHAMINE 30 MG: 30 INJECTION, SOLUTION INTRAMUSCULAR; INTRAVENOUS at 12:33

## 2021-09-20 RX ADMIN — Medication 10 MG: at 12:28

## 2021-09-20 RX ADMIN — OXYTOCIN 30 UNITS: 10 INJECTION, SOLUTION INTRAMUSCULAR; INTRAVENOUS at 12:02

## 2021-09-20 RX ADMIN — CEFAZOLIN SODIUM 2 G: 1 POWDER, FOR SOLUTION INTRAMUSCULAR; INTRAVENOUS at 11:44

## 2021-09-20 RX ADMIN — SODIUM CHLORIDE 500 ML: 9 INJECTION, SOLUTION INTRAVENOUS at 10:51

## 2021-09-20 RX ADMIN — ONDANSETRON HYDROCHLORIDE 4 MG: 2 SOLUTION INTRAMUSCULAR; INTRAVENOUS at 11:55

## 2021-09-20 RX ADMIN — MAGNESIUM SULFATE HEPTAHYDRATE 4 G: 40 INJECTION, SOLUTION INTRAVENOUS at 08:02

## 2021-09-20 RX ADMIN — LIDOCAINE HYDROCHLORIDE AND EPINEPHRINE 3 ML: 15; 5 INJECTION, SOLUTION EPIDURAL at 10:12

## 2021-09-20 RX ADMIN — KETOROLAC TROMETHAMINE 30 MG: 30 INJECTION, SOLUTION INTRAMUSCULAR at 19:41

## 2021-09-20 RX ADMIN — PHENYLEPHRINE HYDROCHLORIDE 50 MCG/MIN: 10 INJECTION INTRAVENOUS at 11:55

## 2021-09-20 RX ADMIN — BUPIVACAINE HYDROCHLORIDE 10 ML: 2.5 INJECTION, SOLUTION EPIDURAL; INFILTRATION; INTRACAUDAL; PERINEURAL at 10:15

## 2021-09-20 NOTE — L&D DELIVERY NOTE
Operative Note    Name: Callie Simon Record Number: [id number]   YOB: 1985  Today's Date: 2021    Pre-operative Diagnosis: 32yo A2 at 37w6d, CHTN, Severe Range Blood Pressures, Morbid obesity,  Non-reassuring fetal surveillance remote from delivery,     Post-operative Diagnosis: same, delivered via Primary low transverse  section     Procedure: 1LTCS     Surgeon(s):  Martin Carroll DO    Assistant: Pilar Douglas SA     Anesthesia: Epidural     EBL: 550cc    IVF: 2L LR     UOP: 100cc     Drains: Grimm catheter to gravity    Prophylactic Antibiotics: Ancef 2gm, Azithromycin 500mg     DVT Prophylaxis: Sequential Compression Devices     Fetal Description: galvan     Birth Information:   Information for the patient's :  Bri [place], Male Joankamille Elisa [[id number]]   Delivery of a   male infant on 2021 at 12:00 PM. Apgars were 8  and 9 . Umbilical Cord: 3 Vessels     Umbilical Cord Events: Nuchal Cord Without Compressions -x 1, OP presentation     Placenta: Expressed removal with Normal appearance.     Amniotic Fluid Volume: Small amount      Amniotic Fluid Description:  Faint meconium staining     Additional intraoperative findings: Normal uterus, fallopian tubes and ovaries     Specimens: Cord gas sent -arterial gas 1.80           Complications: None    Stable to labor room to recover     Viraj Hendrickson DO  2021  12:47 PM

## 2021-09-20 NOTE — PROGRESS NOTES
1130 reviewed efm with dr Ahmet George, interventions performed and sve, aware of bp range and ephedrine being given.   Dr Ahmet George states she is on her way to further assess pt

## 2021-09-20 NOTE — PROGRESS NOTES
Called urgently to bedside by RN for prolonged decel to 60s x 4 minutes. Position changes being done. BP low and she had received ephedrine. Cervical exam performed. 6-7cm/70/-3, minimal cervical change, remote from delivery. Decision made to move to OR for possible emergent . Once in the OR FHTs assessed with FSE and noted to be in the 110s. Despite recovery in FHTs given overall pattern of fetal heart tracing and no cervical change over the last 3 hours with inability to augment her labor due to fetal intolerance it was recommended to proceed with delivery now by . The patient was dosed and prepped for surgery. Questions answered. See OP note for details.      Manuel Parsons,

## 2021-09-20 NOTE — PROGRESS NOTES
1100 Bedside and Verbal shift change report given to [address] [place] [address] (oncoming nurse) by Estella Arizmendi RN (offgoing nurse). Report included the following information SBAR, MAR and Med Rec Status. 1110 This RN gave pt a 500 cc LR bolus r/t hypotensive episode as well as recurrent decels with some being late. JULIO Caputo RN made MD aware. 1120 This RN applied o2 at 2L and advanced pt more on her front/left side/trendelenburg. MD notified by JULIO Caputo RN.    1122 JULIO Caputo at bedside assisted with evaluating pt. Raised pt's right leg up while one left side. Mets[place] 36 Dr Stefanie Bruce called to bedside. MD called for a c/s.     1138 Pt in OR    1247 Pt back in room in stable condition     1248 Per Dustin Myers pt received 2 L of LR in the OR    1303 Mag restarted    1516 Pt resting and tolerating liquids well. Pt declined COVID vaccine and flu shot. 1900 Bedside and Verbal shift change report given to [address] [facility] (oncoming nurse) by [address] [place] [address] (offgoing nurse). Report included the following information SBAR, MAR and Med Rec Status.

## 2021-09-20 NOTE — DISCHARGE SUMMARY
Patient ID:  Marian Jaime  [id number]  89 y.o.  1985    Admit Date: 2021    Discharge Date: 2021     Admitting Physician: Марина Sahni DO    Attending Physician: Chai Duran DO    Admission Diagnoses: Pregnancy [Z34.90], CHTN, Severe Range BPs     Procedures for this admission: 1LTCS for NRFS    Discharge Diagnoses: Same as above with 1LTCS for NRFS  producing a viable infant. Information for the patient's :  Hattie Winters [[id number]]         Discharge Disposition:  Home     Discharge Condition:  Stable     Additional Diagnoses: CHTN, Morbid Obesity . Maternal Labs:   Lab Results   Component Value Date/Time    HBsAg, External Negative 2021 12:00 AM    HIV, External Negative 2021 12:00 AM    Rubella, External Immune 2021 12:00 AM    RPR, External Non-reactive 2021 12:00 AM    GrBStrep, External Negative 2021 12:00 AM       Cord Blood Results:   Information for the patient's :  Hattie Winters [[id number]]   No results found for: PCTABR, ABORH, PCTDIG, BILI, ABORH, ABORHEXT           History of Present Illness:   OB History        5    Para   2    Term                AB   2    Living   2       SAB   2    TAB        Ectopic        Molar        Multiple        Live Births                  Admitted for induction of labor. Hospital Course:   Patient was admitted as above and delivered via 1LTCS for NRFS remote from delivery (fetus OP and nuchal x 1). She received Magnesium for Seizure PPx due to Severe range BPs (no significant proteinuria) for 24hrs PP. Please the chart for details. The postpartum course was unremarkable. She was deemed stable for discharge home on day 3. Follow-up Care:  Follow up with Dr. Mariel Martinez in 1wk for BP and wound check (DANIEL dressing)      Signed:  Марина Sahni DO  2021  12:44 PM

## 2021-09-20 NOTE — DISCHARGE INSTRUCTIONS
Discharge Instructions for  Section    Patient ID:  Stuart Beasley  [id number]  29 y.o.  1985    Take Home Medications     Continue taking your prenatal vitamins if you are breastfeeding. Follow-up care is a key part of your treatment and safety. Be sure to keep all your scheduled appointments    Activity  1. Avoid heavy lifting greater than 10lbs for 2 weeks after your surgery  2. Pelvic rest for 6 weeks, ie, nothing in your vagina for 6 weeks  (no intercourse, tampons, or douching). No tubs for 6 weeks. 3. No driving for 2 weeks and until you are no longer taking prescription pain medications and you can put your foot on the break in a hurry   4. Limit climbing stairs to only when necessary the first 1-2 weeks. Hold the railing and do not carry your baby up and downstairs at first for safety   5. You may walk as tolerated, though be care to not over-do it. Walking will assist in overall healing, decrease constipation and bloating. Abel Brothers feel better more quickly with some daily movement. Diet  Regular diet as tolerated    Wound care  There are several types of incision closures. 1. If you have metal staples in place when you leave the hospital, please call your doctors office to schedule the staple removal as directed at discharge. 2. If you have steri strips covering your incision, you may remove them as they fall off or be sure to remove them about one week after your surgery. Removing them in the shower may be easier. 3. If you have Dermabond, or skin glue, covering your incision, it will fall off slowly in the next few weeks. You may remove it as it begins to peel off. Additional wound care  1. Clear or reddish drainage from your incision is normal.  It's best to leave it open to the air, but if there is drainage, you may cover the incision lightly with gauze, preferably without tape. 2.  Keep the incision clean and dry.     3. Numbness of the skin at or around your incision is normal and the feeling usually returns gradually. 4. Call your doctor if you have increasing drainage from your incision, an unpleasant odor, red streaks, an increase in pain, or if it appears to open. Pain Management  1. Continue prescription pain medication as written by discharging physician  2. Over the counter medications such as Tylenol and ibuprofen (Motrin or Advil) are also ideal.  These may be taken together or in alternating doses. You may  take the maximum dose:  Motrin or Advil (generic ibuprofen), either 3 tablets every 6 hours or 4 tablets every 8 hours. Your prescription medication conntains a narcotic mixed with Tylenol,so  you should not take any extra Tylenol or acetaminophen until you have reduced your prescription pain medication. The maximum dose is Tylenol or acetominophen 1000 mg every 6 hours (equivalent to 2 Extra Strength Tylenols every 6 hours). 3. After a few days, begin to replace the prescription medication with over the counter medications. Use the prescription medication if needed for more severe pain or at night. The prescription medication can be addictive if overused. 4. Add heating pad or sitz baths as needed. Constipation  1. Constipation is normal after surgery, especially while taking prescription narcotic pain medication. 2. Over the counter remedies including ducosate (Colace), take 1-2 capsules 1-2 times daily for soft stool as needed. You may also add/ try milk of magnesia or rectal remedies such as Dulcolax or Fleets enema. Recovery: What to Expect at Home  1. Fatigue is expected. Try to rest when you can and don't worry about doing housework or other tasks which can wait. 2. The soreness in your incision will improve significantly over the first 2 weeks, but it may take 6-8 weeks before you are completely recovered. 3. Back pain or general body aches or muscle soreness are expected and should improve with acetominophen or ibuprofen.   4. Leg swelling due to pregnancy and/or IV fluids given in the hospital will take about two weeks to resolve. 5. Most women experience some form of the \"Baby Blues\" after having a baby. Feeling emotional, tearful, frustrated, anxious, sad, and irritable some of the time is normal and go away after about 2 weeks. Adequate rest and help from your family will help. Take breaks from caring for the baby. Call your doctor if your symptoms seem severe, last more than 2 weeks, or seem to be getting worse instead of better. Get help immediately if you have thoughts of wanting to hurt yourself or others! Call your doctor or seek immediate medical care if you have:  Heavy vaginal bleeding, soaking through one or more pads an hour for several hours. Foul-smelling discharge from your vagina or incision. Consistent nausea and vomiting and cannot keep fluids down. Consistent pain that does not get better after you take pain medicine.   Sudden chest pain and shortness of breath  Signs of a blood clot: pain/ swelling/ increasing redness in your lower extremeties  Signs of infection: increased pain in your abdomen or vaginal area; red streaks, warmth, or tenderness of your breasts; fever of 100.5 F or greater

## 2021-09-20 NOTE — PROGRESS NOTES
7:21 PM  SBAR report received from Preeti Fisher RN. Assumed care of the patient. 7:32 PM  Assessment with mag check performed. Reviewed the visitor policy with the patient. Medicated the patient with Toradol for pain prevention. Patient is without needs at this time. Call bell in reach. 8:32 PM  Mag check performed. Patient is without needs. Call bell in reach. 9:32 PM  Mag check performed. Patient requesting to get up to the bathroom to clean up. 9:45 PM  Assisted the patient to the restroom. Cynthia care done. Assisted the patient back to bed. The patient ambulated without difficulty and had no complaints. 10:32 PM  Mag check performed. Patient without complaints. 11:32 PM  Mag check performed. Patient sleeping. 12:32 AM  Mag check performed. Patient sleeping. 1:33 AM  Mag check performed. Patient medicated with Toradol. Patient sitting up feeding her baby. No complaints voiced. 2:38 AM  Mag check performed. No complaints voiced. 3:32 AM  Mag check performed. Patient sleeping. 4:32 AM  Mag check performed. Patient sleeping soundly. 5:32 AM  Mag check performed and labs drawn. No needs voiced. 6:32 AM  Mag check performed. Patient sleeping soundly. 7:15 AM  SBAR report given to Tarsha Garcia RN. Care of the patient turned over.

## 2021-09-20 NOTE — PROGRESS NOTES
Labor Progress Note  Patient seen, fetal heart rate and contraction pattern evaluated at 0815. Feeling mild CTX. Physical Exam:  Vitals:   Vitals:    09/20/21 0840 09/20/21 0844 09/20/21 0846 09/20/21 0847   BP: 122/64  125/67 125/67   Pulse:   (!) 57    Resp:    18   Temp:       SpO2:  95%     Weight:       Height:             Cervical Exam was examined   6 cm dilated    70% effaced    -2 station    Presenting Part: cephalic  SROM -thin meconium    Uterine Activity[de-identified] Frequency: Every 2-4 minutes, IUPC placed MVUs 220-250  Fetal Heart Rate: Reactive  Baseline: 115 per minute  Variability: moderate  Accelerations: yes  Decelerations: occasional variable decels, some are late in timing, moderate variability throughout   Pitocin: 2mu/min --> off     Assessment/Plan:  Ms. Nahid Aleman is admitted with pregnancy at 37w6d for IOL for [facility] now with Severe range BPs on magnesium, Pr:Cr 0.2.       Internal monitors placed, fetal tracing is Category 2  Pitocin turned off due to occasional late decels   Continue to monitor closely, frequent position changes as needed   MVUs are adequate   Recheck in 2-4hrs or PRN     Farhan Elizabeth DO  9/20/2021  8:56 AM

## 2021-09-20 NOTE — PROGRESS NOTES
9/20/2021   11:52 AM  CM assisted BENITO in ordering breast pump through insurance. Order for [facility] Max Flow sent via [address] com    11:26 AM  CM met with BENITO to complete initial assessment and begin discharge planning. MOB verified and confirmed demographics. BENITO lives with ELSY Lugo ([phone]), at the address on file ([address] [place] [address]). BENITO has a 25, and 7yr old as well. BENITO is employed and plans to take 6wksoff from work. SINDY is also employed and will be taking 2wks off. BENITO reports she has good family support. BENITO plans to breast/bottle feed baby and would like to order a breast pump. Dr. Sabrina Beltran in [place], will provide follow up care for infant. BENITO has car seat, bassinet/crib, clothing, bottles and all necessary supplies for baby. BENITO has [employer] and will be adding baby to this policy. CM discussed process to add baby to insurance, MOB verbalized understanding. BENITO is also enrolled in [facility] services. Care Management Interventions  PCP Verified by CM: Yes (Anika Patterson)  Mode of Transport at Discharge:  Other (see comment)  Transition of Care Consult (CM Consult): Discharge Planning  Support Systems: Other Family Member(s), Spouse/Significant Other  Confirm Follow Up Transport: Family  Discharge Location  Discharge Placement: Home with family assistance  Coby Conner

## 2021-09-20 NOTE — ANESTHESIA PROCEDURE NOTES
Epidural Block    Patient location during procedure: OB  Start time: 9/20/2021 9:55 AM  End time: 9/20/2021 10:20 AM  Reason for block: labor epidural  Staffing  Performed: CRNA   Anesthesiologist: Gavino Finley MD  Resident/CRNA: Jovanny Robertson CRNA  Preanesthetic Checklist  Completed: patient identified, IV checked, site marked, risks and benefits discussed, surgical consent, monitors and equipment checked, pre-op evaluation and timeout performed  Block Placement  Patient position: sitting  Prep: DuraPrep  Sterility prep: cap, drape, gloves, hand and mask  Sedation level: no sedation  Patient monitoring: continuous pulse oximetry, frequent blood pressure checks and heart rate  Approach: midline  Location: lumbar  Lumbar location: L3-L4  Epidural  Loss of resistance technique: saline  Guidance: landmark technique  Needle  Needle type: Tuohy   Needle gauge: 18 G  Needle length: 10 cm  Needle insertion depth: 9.5 cm  Catheter type: multi-orifice  Catheter size: 20 G  Catheter at skin depth: 15 cm  Catheter securement method: clear occlusive dressing, liquid medical adhesive and surgical tape  Test dose: negative  Assessment  Number of attempts: 2  Procedure assessment: patient tolerated procedure well with no immediate complications

## 2021-09-20 NOTE — PROGRESS NOTES
0700  Bedside and Verbal shift change report given to JENNA Morales RN (oncoming nurse) by MEG Boston RN (offgoing nurse). Report included the following information SBAR, Kardex and MAR   0730  Complete assessment done. Cook cath removed. SROM lots of clear fluid. 4052  Elevated BP Labetalol 200mg po given . 1750  Dr Calixto Gama at the bedside to go over magnesium with the pt  0800  Apresoline 20mg IVP held per Dr Calixto Gama for repeat BP of 144/68  8:05 AM Magnesium 4gm losding dose started per MD order  5577  Dr Luvenia Nyhan at the bedside to go over plan of care with the pt. FSE and IUPC placed. SVE 7/50/-2  0822  FHR decel to the 90's with recovery to baseline after turning pt to her left side and starting fluid bolus  0825  Magnesium 2gm maintenance dose hung  8:33 AM Pt resting on her left side  8:39 AM Pt asleep  8:48 AM FHR variable to the 80's for 5 seconds with return to baseline. Fluid bolus continues  8:58 AM FHR decel to the 80's for 5 seconds with recovery to baseline. Pitocin shut off per Dr Luvenia Nyhan, Fluid bolus continues Pt repositioned to her right side. 9:07 AM  FHR decel to the 60's for 5 seconds with recovery to baseline. Pt repositioned to her left side. Fluid bolus complete. Pitocin remains off  [id number]  FHR decel to the 90's for 2 minutes with back to back contractions. Returned to baseline on its own. Dr Luvenia Nyhan aware and reviewed FM strip  0927  FHR decel 60's for 30 seconds with recovery to baseline after repositioning to her back with HOB elevated to 60 degrees. 0945  Pt voided 200cc on bedpan  0950 S. Jannie Mcgee MD at the bedside to go over anesthesia and place epidural  9:52 AM Pt sitting at the bedside for epidural placement  0955  Time out complete. 10:16 AM Taping pts epidural in place  10:18 AM pt in bed lying flat with right tilt. Dr Jannie Mcgee dosing her epidural.  1011  Epidural cath placed. 10:12 AM test dose given  1015  Pt resting on her back with right tilt.   1026  FHR decel to the 50's for 40 seconds with recovery to baseline on its own  1039  FHR decel to 60's for 3 minutes. Pt repositioned to trendelenburg,  Dr Leroy Carvajal notified of FHR decel, SVE. Amnio infusion ordered. 1042  Pt taken out of trendelenburg position and resting on her back  1051 Amnio infusion started Ephedrine given for low BP. LR bolus started. 10:57 AM  Pt repositioned to her left side.

## 2021-09-20 NOTE — LACTATION NOTE
This note was copied from a baby's chart. Discussed with mother her plan for feeding. Reviewed the benefits of exclusive breast milk feeding during the hospital stay. Informed her of the risks of using formula to supplement in the first few days of life as well as the benefits of successful breast milk feeding; referred her to the Breastfeeding booklet about this information. She acknowledges understanding of information reviewed and states that it is her plan to breast and bottle feed her infant. Will support her choice and offer additional information as needed. Reviewed breastfeeding basics:  How milk is made and normal  breastfeeding behaviors discussed. Supply and demand,  stomach size, early feeding cues, skin to skin bonding with comfortable positioning and baby led latch-on reviewed. How to identify signs of successful breastfeeding sessions reviewed; education on assymetrical latch, signs of effective latching vs shallow, in-effective latching, normal  feeding frequency and duration and expected infant output discussed. Normal course of breastfeeding discussed including the AAP's recommendation that children receive exclusive breast milk feedings for the first six months of life with breast milk feedings to continue through the first year of life and/or beyond as complimentary table foods are added. Breastfeeding Booklet and Warm line information provided with discussion. Discussed typical  weight loss and the importance of pediatrician appointment within 24-48 hours of discharge, at 2 weeks of life and normalcy of requesting pediatric weight checks as needed in between visits. Mom states\" I attempted to breastfeed  feed one of my kids, it didn't work. One was failure to thrive. \"  I tried to breastfeed him, but he did not latch. Baby resting in crib, Mm eating lunch.

## 2021-09-20 NOTE — ANESTHESIA PREPROCEDURE EVALUATION
Relevant Problems   NEUROLOGY   (+) Major depressive disorder      CARDIOVASCULAR   (+) Essential hypertension affecting pregnancy in third trimester      ENDOCRINE   (+) Obesity   (+) Obesity complicating childbirth       Anesthetic History   No history of anesthetic complications            Review of Systems / Medical History  Patient summary reviewed, nursing notes reviewed and pertinent labs reviewed    Pulmonary          Smoker  Asthma : well controlled       Neuro/Psych         Psychiatric history  Pertinent negatives: No seizures  Comments: Pt denies history of epilepsy or seizures. Pt does have anxiety and depression Cardiovascular    Hypertension: poorly controlled                Comments: Severe range Bps during pregnancy.   Currently on Mg.   GI/Hepatic/Renal  Within defined limits              Endo/Other        Morbid obesity     Other Findings              Physical Exam    Airway  Mallampati: III  TM Distance: 4 - 6 cm  Neck ROM: normal range of motion   Mouth opening: Diminished (comment)     Cardiovascular    Rhythm: regular  Rate: normal         Dental    Dentition: Lower dentition intact and Upper dentition intact     Pulmonary  Breath sounds clear to auscultation               Abdominal  GI exam deferred       Other Findings            Anesthetic Plan    ASA: 3  Anesthesia type: epidural            Anesthetic plan and risks discussed with: Patient

## 2021-09-20 NOTE — PROGRESS NOTES
The patient has had intermittent severe range blood pressures and has thus far received a total of 30 ,g of Procardia. She denies preeclampsia symptoms. Will start magnesium sulfate for seizure prophylaxis. Indications and risks were d/w the patient.

## 2021-09-21 LAB
ALBUMIN SERPL-MCNC: 2 G/DL (ref 3.5–5)
ALBUMIN/GLOB SERPL: 0.6 {RATIO} (ref 1.1–2.2)
ALP SERPL-CCNC: 119 U/L (ref 45–117)
ALT SERPL-CCNC: 16 U/L (ref 12–78)
ANION GAP SERPL CALC-SCNC: 7 MMOL/L (ref 5–15)
AST SERPL-CCNC: 13 U/L (ref 15–37)
BASOPHILS # BLD: 0.1 K/UL (ref 0–0.1)
BASOPHILS NFR BLD: 0 % (ref 0–1)
BILIRUB SERPL-MCNC: 0.1 MG/DL (ref 0.2–1)
BUN SERPL-MCNC: 9 MG/DL (ref 6–20)
BUN/CREAT SERPL: 17 (ref 12–20)
CALCIUM SERPL-MCNC: 7.4 MG/DL (ref 8.5–10.1)
CHLORIDE SERPL-SCNC: 105 MMOL/L (ref 97–108)
CO2 SERPL-SCNC: 25 MMOL/L (ref 21–32)
CREAT SERPL-MCNC: 0.52 MG/DL (ref 0.55–1.02)
DIFFERENTIAL METHOD BLD: ABNORMAL
EOSINOPHIL # BLD: 0.3 K/UL (ref 0–0.4)
EOSINOPHIL NFR BLD: 1 % (ref 0–7)
ERYTHROCYTE [DISTWIDTH] IN BLOOD BY AUTOMATED COUNT: 14.8 % (ref 11.5–14.5)
GLOBULIN SER CALC-MCNC: 3.1 G/DL (ref 2–4)
GLUCOSE SERPL-MCNC: 84 MG/DL (ref 65–100)
HCT VFR BLD AUTO: 29.5 % (ref 35–47)
HGB BLD-MCNC: 9.5 G/DL (ref 11.5–16)
IMM GRANULOCYTES # BLD AUTO: 0.1 K/UL (ref 0–0.04)
IMM GRANULOCYTES NFR BLD AUTO: 1 % (ref 0–0.5)
LYMPHOCYTES # BLD: 3.5 K/UL (ref 0.8–3.5)
LYMPHOCYTES NFR BLD: 16 % (ref 12–49)
MCH RBC QN AUTO: 29.3 PG (ref 26–34)
MCHC RBC AUTO-ENTMCNC: 32.2 G/DL (ref 30–36.5)
MCV RBC AUTO: 91 FL (ref 80–99)
MONOCYTES # BLD: 1.5 K/UL (ref 0–1)
MONOCYTES NFR BLD: 7 % (ref 5–13)
NEUTS SEG # BLD: 16.6 K/UL (ref 1.8–8)
NEUTS SEG NFR BLD: 75 % (ref 32–75)
NRBC # BLD: 0 K/UL (ref 0–0.01)
NRBC BLD-RTO: 0 PER 100 WBC
PLATELET # BLD AUTO: 300 K/UL (ref 150–400)
PMV BLD AUTO: 11 FL (ref 8.9–12.9)
POTASSIUM SERPL-SCNC: 4.2 MMOL/L (ref 3.5–5.1)
PROT SERPL-MCNC: 5.1 G/DL (ref 6.4–8.2)
RBC # BLD AUTO: 3.24 M/UL (ref 3.8–5.2)
SODIUM SERPL-SCNC: 137 MMOL/L (ref 136–145)
WBC # BLD AUTO: 22.1 K/UL (ref 3.6–11)

## 2021-09-21 PROCEDURE — 80053 COMPREHEN METABOLIC PANEL: CPT

## 2021-09-21 PROCEDURE — 74011250636 HC RX REV CODE- 250/636: Performed by: OBSTETRICS & GYNECOLOGY

## 2021-09-21 PROCEDURE — 74011250637 HC RX REV CODE- 250/637: Performed by: STUDENT IN AN ORGANIZED HEALTH CARE EDUCATION/TRAINING PROGRAM

## 2021-09-21 PROCEDURE — 85025 COMPLETE CBC W/AUTO DIFF WBC: CPT

## 2021-09-21 PROCEDURE — 36415 COLL VENOUS BLD VENIPUNCTURE: CPT

## 2021-09-21 PROCEDURE — 74011250636 HC RX REV CODE- 250/636: Performed by: STUDENT IN AN ORGANIZED HEALTH CARE EDUCATION/TRAINING PROGRAM

## 2021-09-21 PROCEDURE — 65270000029 HC RM PRIVATE

## 2021-09-21 RX ADMIN — MAGNESIUM SULFATE HEPTAHYDRATE 2 G/HR: 40 INJECTION, SOLUTION INTRAVENOUS at 03:39

## 2021-09-21 RX ADMIN — IBUPROFEN 800 MG: 800 TABLET, FILM COATED ORAL at 16:44

## 2021-09-21 RX ADMIN — KETOROLAC TROMETHAMINE 30 MG: 30 INJECTION, SOLUTION INTRAMUSCULAR at 01:46

## 2021-09-21 RX ADMIN — SODIUM CHLORIDE, POTASSIUM CHLORIDE, SODIUM LACTATE AND CALCIUM CHLORIDE 75 ML/HR: 600; 310; 30; 20 INJECTION, SOLUTION INTRAVENOUS at 01:49

## 2021-09-21 RX ADMIN — KETOROLAC TROMETHAMINE 30 MG: 30 INJECTION, SOLUTION INTRAMUSCULAR at 07:50

## 2021-09-21 NOTE — PROGRESS NOTES
0708: Bedside and Verbal shift change report given to Henriette Bence and Tatyana Tate RN (oncoming nurse) by Jazzmine Novoa RN (offgoing nurse). Report included the following information SBAR, OR Summary, Intake/Output, MAR and Recent Results. This RN orienting Yudith RAMOS and overseeing all nursing care. 7881: Continuous magnesium infusion rate verified by this RN with Josh Hughes RN. Patient sitting upright in position of comfort in locked and lowered bed performing infant care. Patient reports incisional discomfort when moving. This RN educating pt that pt is eligible to receive next dose of Toradol shortly. Patient requesting toradol, this RN verbalizing understanding. Patient denies current HA, visual disturbances, RUQ pain, epigastric pain. 0730: Magnesium assessment WDL. 0830: Magnesium assessment WDL. 0845: Cynthia care performed by this RN and Jacquelin Cormeir using baby wipes. Patient able to lift hips bilaterally. Bed pad, chux, and cynthia pad changed. Meal tray at bedside. Patient to eat meal tray. Patient denies additional needs. Call bell within reach. 0930: Magnesium assessment WDL. 1030: Magnesium assessment WDL. Patient resting with bilateral eyes closed. Patient woken by voice by this RN. Patient denies current needs. FOB remains at bedside on couch. 1610: This RN at bedside to round on patient. Patient resting in position of comfort in locked and lowered bed, bottle feeding infant. Patient reports desire to ambulate to restroom to void after feeding infant. This RN verbalizing understanding and educating patient to utilize call bell prior to ambulation. 1820: TRANSFER - OUT REPORT:    Verbal report given to Janey Leslie RN (name) on Ines Kulkarni  being transferred to MIU (unit) for routine progression of care       Report consisted of patients Situation, Background, Assessment and   Recommendations(SBAR).      Information from the following report(s) SBAR, Procedure Summary, Intake/Output, MAR and Recent Results was reviewed with the receiving nurse. Lines:   Peripheral IV 09/19/21 Anterior;Right Hand (Active)   Site Assessment Clean, dry, & intact 09/21/21 1500   Phlebitis Assessment 0 09/21/21 1500   Infiltration Assessment 0 09/21/21 1500   Dressing Status Clean, dry, & intact 09/21/21 1500   Dressing Type Transparent;Tape 09/21/21 1500   Hub Color/Line Status Pink;Capped 09/21/21 1500   Alcohol Cap Used Yes 09/21/21 1500        Opportunity for questions and clarification was provided. Patient transported with:   Registered Nurse Jolanta Calderón RN and Barbara Solomon RN via wheelchair. Patient ambulatory from wheelchair to MIU bed with slow, steady gait.

## 2021-09-21 NOTE — PROGRESS NOTES
0700:  Verbal shift change report given to Lizzy Browning and Yudith RN (oncoming nurse) by Jose A Eng RN (offgoing nurse). Report included the following information SBAR, Intake/Output, MAR and Recent Results.      1245: VORB to discontinue Magnesium drip at 1300 from Dr. Rajat White MD.

## 2021-09-21 NOTE — PROGRESS NOTES
PostPartum Note    Nolan Nelson  [id number]  1985  29 y.o.    S:  Ms. Nolan Nelson is a 29 y.o.  POD #1 s/p LTCS @ 37w6d. Doing well. She had a baby boy. Her lochia is like a period. She describes her pain as mild and is well controlled with PO medications. She is ambulating and voiding. Tolerating PO intake. O:   Visit Vitals  /70   Pulse 80   Temp 98 °F (36.7 °C)   Resp 14   Ht 5' 5\" (1.651 m)   Wt 117 kg (258 lb)   SpO2 100%   Breastfeeding Unknown   BMI 42.93 kg/m²       Lab Results   Component Value Date/Time    WBC 22.1 (H) 2021 05:45 AM    HGB 9.5 (L) 2021 05:45 AM    HCT 29.5 (L) 2021 05:45 AM    PLATELET 966  05:45 AM    MCV 91.0 2021 05:45 AM       Gen - No acute distress  Abdomen - Fundus firm, below the umbilicus   Ext - Warm, well perfused. Nontender    A/P:  POD #1 s/p LTCS @ 37w6d doing well. 1.  Routine PP instructions/ care discussed  2. Blood type - Rh positive  3. Rubella immune  4. Circumcision desired   5. Discharge POD 2 or 3   6. F/U 4-6 weeks for PP check.       Rajat Jimenez MD  [facility]

## 2021-09-21 NOTE — ANESTHESIA POSTPROCEDURE EVALUATION
Procedure(s):   SECTION.     epidural    Anesthesia Post Evaluation      Multimodal analgesia: multimodal analgesia not used between 6 hours prior to anesthesia start to PACU discharge  Patient location during evaluation: PACU  Patient participation: complete - patient participated  Level of consciousness: awake  Pain management: adequate  Airway patency: patent  Anesthetic complications: no  Cardiovascular status: acceptable, blood pressure returned to baseline and hemodynamically stable  Respiratory status: acceptable  Hydration status: acceptable  Post anesthesia nausea and vomiting:  controlled      INITIAL Post-op Vital signs:   Vitals Value Taken Time   /61 21 1439   Temp 36.3 °C (97.4 °F) 21 1330   Pulse 82 21 1439   Resp 16 21 1409   SpO2 95 % 21 1438

## 2021-09-21 NOTE — OP NOTES
[facility] 80  OPERATIVE REPORT    Name:  Yaz Meyer  MR#:  [id number]  :  1985  ACCOUNT #:  [de-identified]  DATE OF SERVICE:  2021    PREOPERATIVE DIAGNOSES:  A 27-year-old  5, para 2, aborta 2, 37 weeks and 6 days with chronic hypertension, severe range blood pressures, morbid obesity and nonreassuring fetal surveillance remote from delivery. POSTOPERATIVE DIAGNOSES:  A 27-year-old  5, para 2, aborta 2, 37 weeks and 6 days with chronic hypertension, severe range blood pressures, morbid obesity and nonreassuring fetal surveillance remote from delivery, delivered via primary low-transverse  section. PROCEDURE PERFORMED:  Primary low-transverse  section. SURGEON:  Segundo Magana DO    ASSISTANT:  Bárbara Slade, surgical assist.    ANESTHESIA:  Epidural.    COMPLICATIONS:  None. SPECIMENS REMOVED:  Cord gas sent and arterial gas noted to be 7.2. IMPLANTS:  None    ESTIMATED BLOOD LOSS:  550 mL. IV FLUIDS:  2 L of crystalloid. URINE OUTPUT:  100 mL. DRAINS:  Grimm catheter to gravity. PROPHYLACTIC ANTIBIOTICS:  Ancef 2 g and azithromycin 500 mg. DVT PROPHYLAXIS:  Sequential compression devices. FINDINGS/FETAL DESCRIPTION:  Seldon De Borgia male infant with Apgars of 8 at one minute and 9 at 5 minutes. Umbilical cord with three vessels. Nuchal cord x1. The infant in OP presentation. Placenta was manually expressed with normal intact appearance. Mild amount of meconium-stained amniotic fluids noted. ADDITIONAL INTRAOPERATIVE FINDINGS:  Normal uterus, fallopian tubes and ovaries. INDICATIONS:  The patient was undergoing induction of labor for chronic hypertension. She, during her induction course, experienced repetitive variable decels. These were treated with position changes, discontinuing Pitocin and amniotic fluid, infusion of normal saline.   However, despite these attempts, she was noted to had a prolonged deceleration for roughly 4 minutes to the 60s. At this point, it was not resolving with position changes. Cervix was checked and was found to be unchanged at approximately 6 cm. She was brought back to the operating room and fetal heart tones again assessed with FSE and were noted to be in the 110s. However, despite given inability to augment or induce labor due to fetal intolerance and the prolonged decel to 60s x 4 minutes, it was recommended that we proceed at that time with delivery via  section. Risks, benefits and alternatives were reviewed and all questions were answered. DESCRIPTION OF THE PROCEDURE IN DETAIL:  The patient was positioned on the operating room table supine with left lateral tilt. Her epidural was dosed for surgery. Her abdomen and vagina were prepped and she was draped in sterile fashion. After a time-out was performed, level of anesthesia was checked and found to be adequate. Pfannenstiel incision was made 2 cm above the pubic symphysis. This incision was carried down sharply to the level of the fascia. The fascia was incised with a knife and fascial opening extended using Roman scissors. The superior border of fascia was grasped with Kocher clamps. The median raphe was taken down using Roman scissors. The muscles were dissected bluntly. Bovie used where needed to take down the muscles off of the fascia. The inferior border of the fascia was dissected off in a similar manner down to the level of the pubic symphysis. The rectus muscles were  bluntly in the midline. The peritoneum identified, elevated, and incised using Metzenbaum scissors. The peritoneal opening was extended manually by pulling. An Mars retractor was placed. The vesicouterine peritoneum was identified.   A hysterotomy was made just above this reflection through the muscle of the uterus until membranes could to be seen and then the uterus was also entered bluntly using 's finger. The membranes were ruptured and scant light meconium-stained fluid was noted. The uterine incision was extended manually by pulling. The 's hand was inserted into the uterus in vertex OP presentation. The infant's vertex was grasped, flexed and brought to the hysterotomy where the head was delivered. Nuchal cord was reduced and then the body was delivered atraumatically using fundal pressure. The infant was vigorous at birth. The cord was doubly clamped and cut and the infant was passed to pediatric team.  The placenta was manually expressed intact. The uterus was exteriorized and wiped of all clots and debris. Hysterotomy was repaired first with 0 Vicryl suture in running locked fashion followed by a second layer of 0 Monocryl suture in imbricating fashion. Good hemostasis was noted. Uterus was replaced into the abdomen. The hysterotomy was again inspected and noted to be hemostatic. The peritoneum was closed using 2-0 Vicryl suture. Rectus muscles were inspected and hemostasis was achieved where needed using the Bovie. The rectus fascia was closed with a #1 Vicryl suture in a running fashion. Subcutaneous tissues were irrigated. Hemostasis was achieved using the Bovie. Approximately 8 cm layer of subcutaneous tissue was closed in 2 layers, first with a 2-0 plain gut suture followed by second layer of 3-0 Vicryl and the skin was closed using 4-0 Monocryl. The incision was made using Steri-Strips and a DANIEL vacuum dressing was applied. All counts were correct at the end of the case. There were no complications.       DO MERY [id number]  D:  09/21/2021 11:14  T:  09/21/2021 13:50  JOB #:  [id number]  CC:  Sudhakar Ventura DO

## 2021-09-21 NOTE — ROUTINE PROCESS
Bedside and Verbal shift change report given to Tanvi Patino RN (oncoming nurse) by Maxwell Sawyer RN (offgoing nurse). Report included the following information SBAR, Kardex and MAR.

## 2021-09-22 PROCEDURE — 74011250637 HC RX REV CODE- 250/637: Performed by: OBSTETRICS & GYNECOLOGY

## 2021-09-22 PROCEDURE — 65270000029 HC RM PRIVATE

## 2021-09-22 PROCEDURE — 74011250637 HC RX REV CODE- 250/637: Performed by: STUDENT IN AN ORGANIZED HEALTH CARE EDUCATION/TRAINING PROGRAM

## 2021-09-22 RX ADMIN — OXYCODONE HYDROCHLORIDE AND ACETAMINOPHEN 1 TABLET: 5; 325 TABLET ORAL at 14:19

## 2021-09-22 RX ADMIN — IBUPROFEN 800 MG: 800 TABLET, FILM COATED ORAL at 08:47

## 2021-09-22 RX ADMIN — IBUPROFEN 800 MG: 800 TABLET, FILM COATED ORAL at 17:28

## 2021-09-22 RX ADMIN — IBUPROFEN 800 MG: 800 TABLET, FILM COATED ORAL at 00:16

## 2021-09-22 RX ADMIN — LABETALOL HYDROCHLORIDE 200 MG: 200 TABLET, FILM COATED ORAL at 08:47

## 2021-09-22 RX ADMIN — OXYCODONE HYDROCHLORIDE AND ACETAMINOPHEN 1 TABLET: 5; 325 TABLET ORAL at 23:39

## 2021-09-22 NOTE — ROUTINE PROCESS
Bedside and Verbal shift change report given to JULIO Tapia RN (oncoming nurse) by [employer]. Leonela Michelle RN (offgoing nurse). Report included the following information SBAR, Kardex, Intake/Output, MAR, Accordion and Recent Results.

## 2021-09-22 NOTE — ROUTINE PROCESS
Bedside and Verbal shift change report given to Ej Robles RN (oncoming nurse) by Lawyer Dario RN (offgoing nurse). Report included the following information SBAR, Kardex and MAR.

## 2021-09-22 NOTE — PROGRESS NOTES
PostPartum Note    Mica Morales  [id number]  1985  29 y.o.    S:  Ms. Mica Morales is a 29 y.o.  POD #2 s/p LTCS @ 37w6d. Doing well. She had a baby boy. Her lochia is like a period. She describes her pain as mild and is well controlled with PO medications. She is ambulating and voiding. Tolerating PO intake. O:   Visit Vitals  /70 (BP 1 Location: Right upper arm, BP Patient Position: At rest)   Pulse 82   Temp 98.2 °F (36.8 °C)   Resp 17   Ht 5' 5\" (1.651 m)   Wt 117 kg (258 lb)   SpO2 95%   Breastfeeding Unknown   BMI 42.93 kg/m²     Vitals:    21 1500 21 1815 21 2222 21 0345   BP: 130/61 139/67 139/82 138/70   Pulse: 77 93 81 82   Resp: 16 16 16 17   Temp: 98.1 °F (36.7 °C) 98.2 °F (36.8 °C) 98.1 °F (36.7 °C) 98.2 °F (36.8 °C)   SpO2: 97% 98% 100% 95%   Weight:       Height:             Lab Results   Component Value Date/Time    WBC 22.1 (H) 2021 05:45 AM    HGB 9.5 (L) 2021 05:45 AM    HCT 29.5 (L) 2021 05:45 AM    PLATELET 700  05:45 AM    MCV 91.0 2021 05:45 AM       Gen - No acute distress  Abdomen - Fundus firm, below the umbilicus; incision c/d/i    Ext - Warm, well perfused. Nontender    A/P:  POD #2 s/p LTCS @ 37w6d doing well. 1.  Routine PP instructions/ care discussed  2. Blood type - Rh +  3. Rubella imm  4. Circumcision desired    5. Discharge POD#3    6. F/U 4-6 weeks for PP check.       Becky Schwartz MD  [facility]

## 2021-09-23 VITALS
WEIGHT: 258 LBS | RESPIRATION RATE: 18 BRPM | SYSTOLIC BLOOD PRESSURE: 141 MMHG | TEMPERATURE: 97.8 F | HEART RATE: 97 BPM | DIASTOLIC BLOOD PRESSURE: 74 MMHG | OXYGEN SATURATION: 97 % | HEIGHT: 65 IN | BODY MASS INDEX: 42.99 KG/M2

## 2021-09-23 PROCEDURE — 74011250637 HC RX REV CODE- 250/637: Performed by: OBSTETRICS & GYNECOLOGY

## 2021-09-23 PROCEDURE — 74011250637 HC RX REV CODE- 250/637: Performed by: STUDENT IN AN ORGANIZED HEALTH CARE EDUCATION/TRAINING PROGRAM

## 2021-09-23 RX ORDER — IBUPROFEN 800 MG/1
800 TABLET ORAL EVERY 8 HOURS
Qty: 40 TABLET | Refills: 0 | Status: SHIPPED | OUTPATIENT
Start: 2021-09-23

## 2021-09-23 RX ORDER — OXYCODONE AND ACETAMINOPHEN 5; 325 MG/1; MG/1
1 TABLET ORAL
Qty: 12 TABLET | Refills: 0 | Status: SHIPPED | OUTPATIENT
Start: 2021-09-23 | End: 2021-09-26

## 2021-09-23 RX ADMIN — OXYCODONE HYDROCHLORIDE AND ACETAMINOPHEN 1 TABLET: 5; 325 TABLET ORAL at 03:44

## 2021-09-23 RX ADMIN — IBUPROFEN 800 MG: 800 TABLET, FILM COATED ORAL at 08:39

## 2021-09-23 RX ADMIN — IBUPROFEN 800 MG: 800 TABLET, FILM COATED ORAL at 01:23

## 2021-09-23 RX ADMIN — LABETALOL HYDROCHLORIDE 200 MG: 200 TABLET, FILM COATED ORAL at 08:42

## 2021-09-23 NOTE — PROGRESS NOTES
8:42 AM-/77, , patient denies dizziness, headache, visual disturbances. Labetalol administered per [facility].     8:53 AM-Patient refused her tdap vaccine. 9:30 AM- Patient signed off the unit to go for a walk, infant brought to nursery. Will complete BP recheck when patient returns. 10:00 AM- Patient and her  still off the unit, called her phone to check in but she and her 's phones both disconnected, infant in nursery. 10:05 AM: Patient returned with , infant returned to room, discharge education completed. Will repeat patient's BP prior to discharge. 11:00 AM: /74, HR 97.    11:15 AM    Patient discharged to home. Discharge instructions and education completed and signed, placed in medical record bin, and patient reported she had no more questions. Bands verified on patient and infant, see footprint sheet. Infant placed in car seat by parent. Prescriptions:   Ibuprofen  Percocet  Sent electronically to [facility].

## 2021-09-23 NOTE — PROGRESS NOTES
PostPartum Note    Cherise Chacon  [id number]  1985  29 y.o.    S:  Ms. Cherise Chacon is a 29 y.o.  POD #3 s/p LTCS @ 37w6d. Doing well. She had a baby boy. Her lochia is like a period. She describes her pain as mild and is well controlled with PO medications. She is ambulating and voiding. Tolerating PO intake. O:   Visit Vitals  BP (!) 140/76 (BP 1 Location: Left upper arm, BP Patient Position: At rest)   Pulse 87   Temp 98 °F (36.7 °C)   Resp 17   Ht 5' 5\" (1.651 m)   Wt 117 kg (258 lb)   SpO2 98%   Breastfeeding Unknown   BMI 42.93 kg/m²     Vitals:    21 1504 21 1920 21 2259 21 0302   BP: 126/62 (!) 140/76 137/76 (!) 140/76   Pulse: 96 90 86 87   Resp: 16 17 17 17   Temp: 97.8 °F (36.6 °C) 98.5 °F (36.9 °C) 98.4 °F (36.9 °C) 98 °F (36.7 °C)   SpO2: 96% 99% 97% 98%   Weight:       Height:             Lab Results   Component Value Date/Time    WBC 22.1 (H) 2021 05:45 AM    HGB 9.5 (L) 2021 05:45 AM    HCT 29.5 (L) 2021 05:45 AM    PLATELET 566  05:45 AM    MCV 91.0 2021 05:45 AM       Gen - No acute distress  Abdomen - Fundus firm, below the umbilicus; incision c/d/i    Ext - Warm, well perfused. Nontender    A/P:  POD #3 s/p 1LTCS @ 37w6d doing well. 1.  Routine PP instructions/ care discussed  2. Blood type - Rh +  3. Rubella imm  4. Circumcision done  5.   Discharge home today, f/u 1wk for incision and BP check in the office      Lita Mcmillan DO

## 2022-02-07 ENCOUNTER — TELEPHONE (OUTPATIENT)
Dept: NEUROLOGY | Age: 37
End: 2022-02-07

## 2022-02-25 ENCOUNTER — OFFICE VISIT (OUTPATIENT)
Dept: NEUROLOGY | Age: 37
End: 2022-02-25
Payer: MEDICAID

## 2022-02-25 DIAGNOSIS — R20.2 PARESTHESIA: Primary | ICD-10-CM

## 2022-02-25 PROCEDURE — 95886 MUSC TEST DONE W/N TEST COMP: CPT | Performed by: PSYCHIATRY & NEUROLOGY

## 2022-02-25 PROCEDURE — 95909 NRV CNDJ TST 5-6 STUDIES: CPT | Performed by: PSYCHIATRY & NEUROLOGY

## 2022-02-25 NOTE — LETTER
2022 1:42 PM    Patient:  Tonya Bonds   YOB: 1985  Date of Visit: 2022      Dear Hiral Parker DO  [place] [phone]  Via Fax: [phone]:      Thank you for referring Ms. Tonia De Santiago to me for EMG/NCS. EMG/ NCS Report  [facility] - INPATIENT  [place] [address] [place], [address]   Ph: [phone]   FAX: [phone]  Test Date:  2019      Test Date:  2022    Patient: Marleni Colby : 1985 Physician: Kaiden Foley MD   Sex: Female Height: ' \" Ref Phys: Gloria Mayorga    ID#: [id number] Weight:  lbs. Technician: Martha Olsen     Patient History / Exam:  Several yrs of numbness of the right hand involving thumb to 3rd fingers. Patient has a 5 mo. old child. (-) neck pain. Patient is coming for neuropathy evaluation. EMG & NCV Findings:  Evaluation of the right median motor nerve showed prolonged distal onset latency (5.3 ms), normal amplitude (9.1 mV), and normal conduction velocity (Elbow-Wrist, 52 m/s). The right ulnar motor nerve showed normal distal onset latency (2.3 ms), normal amplitude (10.3 mV), normal conduction velocity (B Elbow-Wrist, 67 m/s), and normal conduction velocity (A Elbow-B Elbow, 62 m/s). The right median sensory nerve showed no response (Wrist). The right radial sensory and the right ulnar sensory nerves showed normal distal peak latency (R1.8, R2.6 ms) and normal amplitude (R61.3, R45.0 µV). All F Wave latencies were within normal limits. Needle evaluation of the right abductor pollicis brevis muscle showed diminished recruitment. All remaining muscles (as indicated in the following table) showed no evidence of electrical instability.         Impression:  ABNORMAL    Extensive electrodiagnostic examination of the right upper extremity shows evidence of a right median mononeuropathy at or distal to the wrist, consistent with a clinical diagnosis of carpal tunnel syndrome, moderate to severe in degree electrically.          Denia Lee MD  Diplomate, American Board of Psychiatry and Neurology  Diplomate, Neuromuscular Medicine  Diplomate, American Board of Electrodiagnostic Medicine  Director, [address] [place] [address] Accredited Laboratory with Exemplary Status          Nerve Conduction Studies  Anti Sensory Summary Table     Stim Site NR Peak (ms) Norm Peak (ms) P-T Amp (µV) Norm P-T Amp Site1 Site2 Dist (cm)   Right Median Anti Sensory (2nd Digit)  33.8 °C   Wrist NR  <4  >13 Wrist 2nd Digit 14.0   Right Radial Anti Sensory (Base 1st Digit)  34 °C   Wrist    1.8 <2.8 61.3 >11 Wrist Base 1st Digit 10.0   Right Ulnar Anti Sensory (5th Digit)  34 °C   Wrist    2.6 <4.0 45.0 >9 Wrist 5th Digit 14.0     Motor Summary Table     Stim Site NR Onset (ms) Norm Onset (ms) O-P Amp (mV) Norm O-P Amp Amp (Prev) (%) Site1 Site2 Dist (cm) Dilshad (m/s) Norm Dilshad (m/s)   Right Median Motor (Abd Poll Brev)  34.1 °C   Wrist    5.3 <4.5 9.1 >4.1 100.0 Wrist Abd Poll Brev 8.0     Elbow    9.3  9.2  101.1 Elbow Wrist 21.0 52 >49   Right Ulnar Motor (Abd Dig Minimi)  34.1 °C   Wrist    2.3 <3.1 10.3 >7.0 100.0 Wrist Abd Dig Minimi 8.0     B Elbow    5.3  10.3  100.0 B Elbow Wrist 20.0 67 >50   A Elbow    6.9  9.6  93.2 A Elbow B Elbow 10.0 62 >50     F Wave Studies     NR F-Lat (ms) Lat Norm (ms) L-R F-Lat (ms) L-R Lat Norm   Right Ulnar (Mrkrs) (Abd Dig Min)  34.2 °C      24.56 <32  <2.5     EMG     Side Muscle Nerve Root Ins Act Fibs Psw Recrt Duration Amp Poly Comment   Right 1stDorInt Ulnar C8-T1 Nml Nml Nml Nml Nml Nml Nml    Right ExtIndicis Radial (Post Int) C7-8 Nml Nml Nml Nml Nml Nml Nml    Right Abd Poll Brev Median C8-T1 Nml Nml Nml Reduced Nml Nml Nml    Right PronatorTeres Median C6-7 Nml Nml Nml Nml Nml Nml Nml    Right Biceps Musculocut C5-6 Nml Nml Nml Nml Nml Nml Nml    Right Triceps Radial C6-7-8 Nml Nml Nml Nml Nml Nml Nml    Right Lower Cerv Parasp Rami C7,T1 Nml Nml Nml Nml Nml Nml Nml                Nerve Conduction Studies  Anti Sensory Left/Right Comparison     Stim Site L Lat (ms) R Lat (ms) L-R Lat (ms) L Amp (µV) R Amp (µV) L-R Amp (%) Site1 Site2 L Dilshad (m/s) R Dilshad (m/s) L-R Dilshad (m/s)   Median Anti Sensory (2nd Digit)  33.8 °C   Wrist       Wrist 2nd Digit      Radial Anti Sensory (Base 1st Digit)  34 °C   Wrist  1.3   61.3  Wrist Base 1st Digit  77    Ulnar Anti Sensory (5th Digit)  34 °C   Wrist  1.9   45.0  Wrist 5th Digit  74      Motor Left/Right Comparison     Stim Site L Lat (ms) R Lat (ms) L-R Lat (ms) L Amp (mV) R Amp (mV) L-R Amp (%) Site1 Site2 L Dilshad (m/s) R Dilshad (m/s) L-R Dilshad (m/s)   Median Motor (Abd Poll Brev)  34.1 °C   Wrist  5.3   9.1  Wrist Abd Poll Brev      Elbow  9.3   9.2  Elbow Wrist  52    Ulnar Motor (Abd Dig Minimi)  34.1 °C   Wrist  2.3   10.3  Wrist Abd Dig Minimi      B Elbow  5.3   10.3  B Elbow Wrist  67    A Elbow  6.9   9.6  A Elbow B Elbow  62          Waveforms:

## 2022-02-25 NOTE — PROCEDURES
EMG/ NCS Report  [facility] - INPATIENT  [address] [facility] [address] [place], [address]   Ph: [phone]   FAX: [phone]  Test Date:  2019      Test Date:  2022    Patient: Landy Sweet : 1985 Physician: Marie Lester MD   Sex: Female Height: ' \" Ref Phys: Blaise Bartlett, DO   ID#: [id number] Weight:  lbs. Technician: Arnaud Coleman     Patient History / Exam:  Several yrs of numbness of the right hand involving thumb to 3rd fingers. Patient has a 5 mo. old child. (-) neck pain. Patient is coming for neuropathy evaluation. EMG & NCV Findings:  Evaluation of the right median motor nerve showed prolonged distal onset latency (5.3 ms), normal amplitude (9.1 mV), and normal conduction velocity (Elbow-Wrist, 52 m/s). The right ulnar motor nerve showed normal distal onset latency (2.3 ms), normal amplitude (10.3 mV), normal conduction velocity (B Elbow-Wrist, 67 m/s), and normal conduction velocity (A Elbow-B Elbow, 62 m/s). The right median sensory nerve showed no response (Wrist). The right radial sensory and the right ulnar sensory nerves showed normal distal peak latency (R1.8, R2.6 ms) and normal amplitude (R61.3, R45.0 µV). All F Wave latencies were within normal limits. Needle evaluation of the right abductor pollicis brevis muscle showed diminished recruitment. All remaining muscles (as indicated in the following table) showed no evidence of electrical instability. Impression:  ABNORMAL    Extensive electrodiagnostic examination of the right upper extremity shows evidence of a right median mononeuropathy at or distal to the wrist, consistent with a clinical diagnosis of carpal tunnel syndrome, moderate to severe in degree electrically.          Marie Lester MD  Diplomate, American Board of Psychiatry and Neurology  Diplomate, Neuromuscular Medicine  Diplomate, American Board of Electrodiagnostic Medicine  Director, [address] [place] [address] Accredited Laboratory with Exemplary Status          Nerve Conduction Studies  Anti Sensory Summary Table     Stim Site NR Peak (ms) Norm Peak (ms) P-T Amp (µV) Norm P-T Amp Site1 Site2 Dist (cm)   Right Median Anti Sensory (2nd Digit)  33.8 °C   Wrist NR  <4  >13 Wrist 2nd Digit 14.0   Right Radial Anti Sensory (Base 1st Digit)  34 °C   Wrist    1.8 <2.8 61.3 >11 Wrist Base 1st Digit 10.0   Right Ulnar Anti Sensory (5th Digit)  34 °C   Wrist    2.6 <4.0 45.0 >9 Wrist 5th Digit 14.0     Motor Summary Table     Stim Site NR Onset (ms) Norm Onset (ms) O-P Amp (mV) Norm O-P Amp Amp (Prev) (%) Site1 Site2 Dist (cm) Dilshad (m/s) Norm Dilshad (m/s)   Right Median Motor (Abd Poll Brev)  34.1 °C   Wrist    5.3 <4.5 9.1 >4.1 100.0 Wrist Abd Poll Brev 8.0     Elbow    9.3  9.2  101.1 Elbow Wrist 21.0 52 >49   Right Ulnar Motor (Abd Dig Minimi)  34.1 °C   Wrist    2.3 <3.1 10.3 >7.0 100.0 Wrist Abd Dig Minimi 8.0     B Elbow    5.3  10.3  100.0 B Elbow Wrist 20.0 67 >50   A Elbow    6.9  9.6  93.2 A Elbow B Elbow 10.0 62 >50     F Wave Studies     NR F-Lat (ms) Lat Norm (ms) L-R F-Lat (ms) L-R Lat Norm   Right Ulnar (Mrkrs) (Abd Dig Min)  34.2 °C      24.56 <32  <2.5     EMG     Side Muscle Nerve Root Ins Act Fibs Psw Recrt Duration Amp Poly Comment   Right 1stDorInt Ulnar C8-T1 Nml Nml Nml Nml Nml Nml Nml    Right ExtIndicis Radial (Post Int) C7-8 Nml Nml Nml Nml Nml Nml Nml    Right Abd Poll Brev Median C8-T1 Nml Nml Nml Reduced Nml Nml Nml    Right PronatorTeres Median C6-7 Nml Nml Nml Nml Nml Nml Nml    Right Biceps Musculocut C5-6 Nml Nml Nml Nml Nml Nml Nml    Right Triceps Radial C6-7-8 Nml Nml Nml Nml Nml Nml Nml    Right Lower Cerv Parasp Rami C7,T1 Nml Nml Nml Nml Nml Nml Nml                Nerve Conduction Studies  Anti Sensory Left/Right Comparison     Stim Site L Lat (ms) R Lat (ms) L-R Lat (ms) L Amp (µV) R Amp (µV) L-R Amp (%) Site1 Site2 L Dilshad (m/s) R Dilshad (m/s) L-R Dilshad (m/s)   Median Anti Sensory (2nd Digit)  33.8 °C   Wrist       Wrist 2nd Digit      Radial Anti Sensory (Base 1st Digit)  34 °C   Wrist  1.3   61.3  Wrist Base 1st Digit  77    Ulnar Anti Sensory (5th Digit)  34 °C   Wrist  1.9   45.0  Wrist 5th Digit  74      Motor Left/Right Comparison     Stim Site L Lat (ms) R Lat (ms) L-R Lat (ms) L Amp (mV) R Amp (mV) L-R Amp (%) Site1 Site2 L Dilshad (m/s) R Dilshad (m/s) L-R Dilshad (m/s)   Median Motor (Abd Poll Brev)  34.1 °C   Wrist  5.3   9.1  Wrist Abd Poll Brev      Elbow  9.3   9.2  Elbow Wrist  52    Ulnar Motor (Abd Dig Minimi)  34.1 °C   Wrist  2.3   10.3  Wrist Abd Dig Minimi      B Elbow  5.3   10.3  B Elbow Wrist  67    A Elbow  6.9   9.6  A Elbow B Elbow  62          Waveforms:

## 2022-03-18 PROBLEM — F32.9 MAJOR DEPRESSIVE DISORDER: Status: ACTIVE | Noted: 2021-09-19

## 2022-03-18 PROBLEM — O43.193 MARGINAL INSERTION OF UMBILICAL CORD AFFECTING MANAGEMENT OF MOTHER IN THIRD TRIMESTER: Status: ACTIVE | Noted: 2021-09-19

## 2022-03-19 PROBLEM — Z34.90 PREGNANCY: Status: ACTIVE | Noted: 2021-09-19

## 2022-03-19 PROBLEM — O99.333 TOBACCO USE AFFECTING PREGNANCY IN THIRD TRIMESTER, ANTEPARTUM: Status: ACTIVE | Noted: 2021-09-19

## 2022-03-19 PROBLEM — O10.013 ESSENTIAL HYPERTENSION AFFECTING PREGNANCY IN THIRD TRIMESTER: Status: ACTIVE | Noted: 2021-09-19

## 2022-03-20 PROBLEM — F41.9 ANXIETY: Status: ACTIVE | Noted: 2021-09-19

## 2022-10-06 ENCOUNTER — HOSPITAL ENCOUNTER (EMERGENCY)
Age: 37
Discharge: HOME | End: 2022-10-06
Attending: EMERGENCY MEDICINE
Payer: MEDICAID

## 2022-10-06 ENCOUNTER — APPOINTMENT (OUTPATIENT)
Dept: CT IMAGING | Age: 37
End: 2022-10-06
Attending: EMERGENCY MEDICINE
Payer: MEDICAID

## 2022-10-06 VITALS
HEIGHT: 65 IN | RESPIRATION RATE: 18 BRPM | TEMPERATURE: 98.3 F | SYSTOLIC BLOOD PRESSURE: 134 MMHG | DIASTOLIC BLOOD PRESSURE: 91 MMHG | OXYGEN SATURATION: 97 % | BODY MASS INDEX: 45.82 KG/M2 | WEIGHT: 275 LBS | HEART RATE: 90 BPM

## 2022-10-06 DIAGNOSIS — M62.838 MUSCLE SPASMS OF NECK: Primary | ICD-10-CM

## 2022-10-06 LAB
APPEARANCE UR: ABNORMAL
BACTERIA URNS QL MICRO: ABNORMAL /HPF
BACTERIA URNS QL MICRO: ABNORMAL /HPF
BILIRUB UR QL: NEGATIVE
COLOR UR: ABNORMAL
GLUCOSE UR STRIP.AUTO-MCNC: NEGATIVE MG/DL
HCG UR QL: NEGATIVE
HGB UR QL STRIP: ABNORMAL
KETONES UR QL STRIP.AUTO: NEGATIVE MG/DL
LEUKOCYTE ESTERASE UR QL STRIP.AUTO: NEGATIVE
MUCOUS THREADS URNS QL MICRO: ABNORMAL /LPF
NITRITE UR QL STRIP.AUTO: NEGATIVE
PH UR STRIP: 5 [PH] (ref 5–8)
PROT UR STRIP-MCNC: NEGATIVE MG/DL
RBC #/AREA URNS HPF: 4 /HPF (ref 0–5)
RBC #/AREA URNS HPF: ABNORMAL /HPF (ref 0–5)
SP GR UR REFRACTOMETRY: 1.01 (ref 1–1.03)
UROBILINOGEN UR QL STRIP.AUTO: 0.1 EU/DL (ref 0.1–1)
WBC URNS QL MICRO: 3 /HPF (ref 0–4)
WBC URNS QL MICRO: ABNORMAL /HPF (ref 0–4)

## 2022-10-06 PROCEDURE — 81025 URINE PREGNANCY TEST: CPT

## 2022-10-06 PROCEDURE — 74011000636 HC RX REV CODE- 636: Performed by: EMERGENCY MEDICINE

## 2022-10-06 PROCEDURE — 70496 CT ANGIOGRAPHY HEAD: CPT

## 2022-10-06 PROCEDURE — 81001 URINALYSIS AUTO W/SCOPE: CPT

## 2022-10-06 PROCEDURE — 70450 CT HEAD/BRAIN W/O DYE: CPT

## 2022-10-06 PROCEDURE — 99285 EMERGENCY DEPT VISIT HI MDM: CPT

## 2022-10-06 RX ORDER — CYCLOBENZAPRINE HCL 10 MG
10 TABLET ORAL
Qty: 6 TABLET | Refills: 0 | Status: SHIPPED | OUTPATIENT
Start: 2022-10-06 | End: 2022-10-09

## 2022-10-06 RX ORDER — NAPROXEN 500 MG/1
500 TABLET ORAL 2 TIMES DAILY WITH MEALS
Qty: 10 TABLET | Refills: 0 | Status: SHIPPED | OUTPATIENT
Start: 2022-10-06 | End: 2022-10-11

## 2022-10-06 RX ORDER — IBUPROFEN 600 MG/1
600 TABLET ORAL
Status: DISCONTINUED | OUTPATIENT
Start: 2022-10-06 | End: 2022-10-07 | Stop reason: HOSPADM

## 2022-10-06 RX ADMIN — IOPAMIDOL 100 ML: 755 INJECTION, SOLUTION INTRAVENOUS at 18:16

## 2022-10-06 NOTE — DISCHARGE INSTRUCTIONS
Thank you! Thank you for allowing me to care for you in the emergency department. It is my goal to provide you with excellent care. If you have not received excellent quality care, please ask to speak to the nurse manager. Please fill out the survey that will come to you by mail or email since we listen to your feedback! Below you will find a list of your tests from today's visit. Should you have any questions, please do not hesitate to call the emergency department. Labs  Recent Results (from the past 12 hour(s))   HCG URINE, QL    Collection Time: 10/06/22  4:08 PM   Result Value Ref Range    HCG urine, QL Negative Negative     URINALYSIS W/ RFLX MICROSCOPIC    Collection Time: 10/06/22  4:08 PM   Result Value Ref Range    Color Yellow/Straw      Appearance Turbid (A) Clear      Specific gravity 1.015 1.003 - 1.030      pH (UA) 5.0 5.0 - 8.0      Protein Negative Negative mg/dL    Glucose Negative Negative mg/dL    Ketone Negative Negative mg/dL    Bilirubin Negative Negative      Blood Moderate (A) Negative      Urobilinogen 0.1 0.1 - 1.0 EU/dL    Nitrites Negative Negative      Leukocyte Esterase Negative Negative      WBC 0-4 0 - 4 /hpf    RBC 0-5 0 - 5 /hpf    Bacteria 1+ (A) Negative /hpf    Mucus Trace /lpf   URINE MICROSCOPIC    Collection Time: 10/06/22  4:08 PM   Result Value Ref Range    WBC PENDING /hpf    RBC PENDING /hpf    Bacteria PENDING /hpf       Radiologic Studies  CT HEAD WO CONT   Final Result   No evidence of acute process. CTA HEAD NECK W CONT   Final Result      1. No acute process. No large vessel occlusion, arterial dissection, or   flow-limiting stenosis. 2. CT perfusion not performed. If clinical concern for acute infarction consider   MRI        CT Results  (Last 48 hours)                 10/06/22 1817  CT HEAD WO CONT Final result    Impression:  No evidence of acute process.                Narrative:  EXAM: CT HEAD WO CONT       INDICATION: Headache COMPARISON: None. CONTRAST: None. TECHNIQUE: Unenhanced CT of the head was performed using 5 mm images. Brain and   bone windows were generated. Coronal and sagittal reformats. CT dose reduction   was achieved through use of a standardized protocol tailored for this   examination and automatic exposure control for dose modulation. FINDINGS:   The ventricles and sulci are normal in size, shape and configuration. . There is   no significant white matter disease. There is no intracranial hemorrhage,   extra-axial collection, or mass effect. The basilar cisterns are open. No CT   evidence of acute infarct. The bone windows demonstrate no abnormalities. The visualized portions of the   paranasal sinuses and mastoid air cells are clear. 10/06/22 1817  CTA HEAD NECK W CONT Final result    Impression:      1. No acute process. No large vessel occlusion, arterial dissection, or   flow-limiting stenosis. 2. CT perfusion not performed. If clinical concern for acute infarction consider   MRI       Narrative:  INDICATION: HA       EXAMINATION:  CT ANGIOGRAPHY HEAD AND NECK        COMPARISON: CT head earlier today       TECHNIQUE:  Following the uneventful administration of iodinated contrast   material, axial CT angiography of the head and neck was performed. Delayed axial   images through the head were not obtained. Coronal and sagittal reconstructions   were obtained. Manual postprocessing of images was performed. 3-D  Sagittal   maximal intensity projection images were obtained. 3-D Coronal maximal   intensity projections were obtained. CT dose reduction was achieved through use   of a standardized protocol tailored for this examination and automatic exposure   control for dose modulation. FINDINGS:       CTA NECK:       Aortic Arch: No significant abnormality. Right Common Carotid Artery: No significant abnormality. Right Internal Carotid Artery: No significant abnormality. NASCET Right: 0-25%. Left Common Carotid Artery: No significant abnormality. Left Internal Carotid Artery: No significant abnormality. NASCET Left: 0-25%. Carotid stenosis determined using NASCET criteria. Right Vertebral Artery: No significant abnormality. Left Vertebral Artery: No significant abnormality. Cervical Soft Tissues: No significant abnormality. Lung Apices: No significant abnormality. Bones: No destructive bone lesion. Additional Comments: Bilateral optic drusen bodies. Partial empty sella turcica. CTA HEAD:       Posterior Circulation: No flow limiting stenosis or occlusion. Anterior Circulation: No flow limiting stenosis or occlusion. Additional Comments: No evidence of aneurysm or vascular malformation. Note: Cerebral perfusion not performed. CXR Results  (Last 48 hours)      None          ------------------------------------------------------------------------------------------------------------  The exam and treatment you received in the Emergency Department were for an urgent problem and are not intended as complete care. It is important that you follow-up with a doctor, nurse practitioner, or physician assistant to:  (1) confirm your diagnosis,  (2) re-evaluation of changes in your illness and treatment, and  (3) for ongoing care. Please take your discharge instructions with you when you go to your follow-up appointment. If you have any problem arranging a follow-up appointment, contact the Emergency Department. If your symptoms become worse or you do not improve as expected and you are unable to reach your health care provider, please return to the Emergency Department. We are available 24 hours a day. If a prescription has been provided, please have it filled as soon as possible to prevent a delay in treatment.  If you have any questions or reservations about taking the medication due to side effects or interactions with other medications, please call your primary care provider or contact the ER.

## 2022-10-08 NOTE — ED PROVIDER NOTES
EMERGENCY DEPARTMENT HISTORY AND PHYSICAL EXAM      Date: 10/6/2022  Patient Name: Luis Alberto Scott    History of Presenting Illness     Chief Complaint   Patient presents with    Neck Pain       History Provided By: Patient    HPI: Luis Alberto Scott, 40 y.o. female with no significant past medical history presenting to the emergency department for evaluation of neck pain and stiffness x2 days. Patient denies inciting event. Reports associated headache. Denies nausea, vomiting, vision changes, weakness, paresthesias. Reports pain is made worse when looking to the right. There are no other complaints, changes, or physical findings at this time. PCP: Aubrie Guadarrama, DO    No current facility-administered medications on file prior to encounter. Current Outpatient Medications on File Prior to Encounter   Medication Sig Dispense Refill    ibuprofen (MOTRIN) 800 mg tablet Take 1 Tablet by mouth every eight (8) hours. 40 Tablet 0    sertraline HCl (ZOLOFT PO) Take  by mouth. labetaloL (NORMODYNE) 200 mg tablet Take 200 mg by mouth daily. Past History     Past Medical History:  Past Medical History:   Diagnosis Date    Anxiety     Asthma     Bronchitis     Depression     Epilepsy ([facility] [address]     Essential hypertension affecting pregnancy in third trimester 9/19/2021    Obesity     Postpartum depression        Past Surgical History:  Past Surgical History:   Procedure Laterality Date    HX OTHER SURGICAL      wisdom teeth, D&C, tonsillectomy       Family History:  No family history on file. Social History:  Social History     Tobacco Use    Smoking status: Every Day     Packs/day: 0.50     Types: Cigarettes    Smokeless tobacco: Never   Vaping Use    Vaping Use: Never used   Substance Use Topics    Alcohol use: Not Currently    Drug use: Never       Allergies:   Allergies   Allergen Reactions    Sulfa (Sulfonamide Antibiotics) Not Reported This Time       Review of Systems   Review of Systems Constitutional:  Negative for chills and fever. HENT:  Negative for congestion and sore throat. Eyes:  Negative for pain and visual disturbance. Respiratory:  Negative for cough and shortness of breath. Cardiovascular:  Negative for chest pain and palpitations. Gastrointestinal:  Negative for constipation, diarrhea, nausea and vomiting. Genitourinary:  Negative for dysuria and frequency. Musculoskeletal:  Positive for neck pain and neck stiffness. Negative for arthralgias and myalgias. Skin:  Negative for color change and rash. Neurological:  Negative for dizziness, weakness, light-headedness, numbness and headaches. Physical Exam   Physical Exam  Constitutional:       Appearance: Normal appearance. HENT:      Head: Normocephalic and atraumatic. Right Ear: External ear normal.      Left Ear: External ear normal.      Nose: Nose normal.      Mouth/Throat:      Mouth: Mucous membranes are moist.   Eyes:      Extraocular Movements: Extraocular movements intact. Conjunctiva/sclera: Conjunctivae normal.   Neck:      Vascular: Normal carotid pulses. No carotid bruit or JVD. Trachea: Trachea normal.      Meningeal: Kernig's sign absent. Cardiovascular:      Rate and Rhythm: Normal rate and regular rhythm. Pulses: Normal pulses. Heart sounds: Normal heart sounds. Pulmonary:      Effort: Pulmonary effort is normal.      Breath sounds: Normal breath sounds. Abdominal:      General: Abdomen is flat. There is no distension. Palpations: Abdomen is soft. Tenderness: There is no abdominal tenderness. Musculoskeletal:      Cervical back: Pain with movement present. No spinous process tenderness or muscular tenderness. Decreased range of motion. Skin:     General: Skin is warm and dry. Capillary Refill: Capillary refill takes less than 2 seconds. Neurological:      General: No focal deficit present.       Mental Status: She is alert and oriented to person, place, and time. Mental status is at baseline. Psychiatric:         Mood and Affect: Mood normal.         Behavior: Behavior normal.       Lab and Diagnostic Study Results   Labs -   No results found for this or any previous visit (from the past 12 hour(s)). Radiologic Studies -   @lastxrresult@  CT Results  (Last 48 hours)      None          CXR Results  (Last 48 hours)      None            Medical Decision Making and ED Course   Differential Diagnosis & Medical Decision Making Provider Note:   44-year-old otherwise healthy female presenting to the emergency department for evaluation of 2 days of neck pain and decreased range of motion. She reports associated frontal headache which developed slowly. Denies fevers, vision changes, nausea, vomiting. On exam, patient with decreased range of motion of the neck especially in the right word rotation, however is noted in all directions. Patient has no neurologic findings. Gait is steady/non-ataxic. She has no photophobia. Given lack of tenderness to palpation, CT head and C-spine was ordered which was negative. Do not feel that LP is warranted at this point in time based on lack of fever, neurologic findings, absence of thunderclap headache as well as absence of Kernig sign. Muscle relaxer and nonsteroidal sent to patient's pharmacy on file. Patient did elope prior to receiving discharge paperwork. - I am the first provider for this patient. I reviewed the vital signs, available nursing notes, past medical history, past surgical history, family history and social history. The patients presenting problems have been discussed, and they are in agreement with the care plan formulated and outlined with them. I have encouraged them to ask questions as they arise throughout their visit. Vital Signs-Reviewed the patient's vital signs. No data found.     ED Course:          Procedures   Performed by: Benoit Ch DO  Procedures      Disposition Disposition: Patient left ED without informing/speaking with physician staff - unable to given any Rx or discharge instructions due to pts leaving    DISCHARGE PLAN:  1. Cannot display discharge medications since this patient is not currently admitted. 2.   Follow-up Information       Follow up With Specialties Details Why [facility]    [address] [facility] ER EMERGENCY DEPT Emergency Medicine  As needed, If symptoms worsen [address] [facility] [id number]  [address] [place], [address] [place] [address],  Family Medicine Schedule an appointment as soon as possible for a visit   [address] [address] [facility]  [phone]            3. Return to ED if worse   4. Discharge Medication List as of 10/6/2022 11:08 PM        START taking these medications    Details   cyclobenzaprine (FLEXERIL) 10 mg tablet Take 1 Tablet by mouth two (2) times daily as needed for Muscle Spasm(s) for up to 3 days. , Normal, Disp-6 Tablet, R-0      naproxen (Naprosyn) 500 mg tablet Take 1 Tablet by mouth two (2) times daily (with meals) for 5 days. , Normal, Disp-10 Tablet, R-0           CONTINUE these medications which have NOT CHANGED    Details   ibuprofen (MOTRIN) 800 mg tablet Take 1 Tablet by mouth every eight (8) hours. , Normal, Disp-40 Tablet, R-0      sertraline HCl (ZOLOFT PO) Take  by mouth., Historical Med      labetaloL (NORMODYNE) 200 mg tablet Take 200 mg by mouth daily. , Historical Med            Remove if admitted/transferred    Diagnosis/Clinical Impression     Clinical Impression:   1. Muscle spasms of neck        Attestations: I, Lalee Moment, DO, am the primary clinician of record. Please note that this dictation was completed with [employer] voice recognition software. Quite often unanticipated grammatical, syntax, homophones, and other interpretive errors are inadvertently transcribed by the computer software. Please disregard these errors.   Please excuse any errors that have escaped final proofreading. Thank you.

## 2022-10-09 ENCOUNTER — APPOINTMENT (OUTPATIENT)
Dept: GENERAL RADIOLOGY | Age: 37
End: 2022-10-09
Attending: STUDENT IN AN ORGANIZED HEALTH CARE EDUCATION/TRAINING PROGRAM
Payer: MEDICAID

## 2022-10-09 ENCOUNTER — HOSPITAL ENCOUNTER (EMERGENCY)
Age: 37
Discharge: HOME | End: 2022-10-09
Attending: STUDENT IN AN ORGANIZED HEALTH CARE EDUCATION/TRAINING PROGRAM
Payer: MEDICAID

## 2022-10-09 VITALS
HEART RATE: 75 BPM | OXYGEN SATURATION: 97 % | RESPIRATION RATE: 18 BRPM | DIASTOLIC BLOOD PRESSURE: 90 MMHG | WEIGHT: 275 LBS | HEIGHT: 65 IN | BODY MASS INDEX: 45.82 KG/M2 | TEMPERATURE: 98.1 F | SYSTOLIC BLOOD PRESSURE: 156 MMHG

## 2022-10-09 DIAGNOSIS — R07.89 CHEST WALL PAIN: Primary | ICD-10-CM

## 2022-10-09 LAB
ALBUMIN SERPL-MCNC: 3.6 G/DL (ref 3.5–5)
ALBUMIN/GLOB SERPL: 1.1 {RATIO} (ref 1.1–2.2)
ALP SERPL-CCNC: 100 U/L (ref 45–117)
ALT SERPL-CCNC: 17 U/L (ref 12–78)
ANION GAP SERPL CALC-SCNC: 4 MMOL/L (ref 5–15)
AST SERPL W P-5'-P-CCNC: 16 U/L (ref 15–37)
ATRIAL RATE: 65 BPM
BASOPHILS # BLD: 0.2 K/UL (ref 0–0.1)
BASOPHILS NFR BLD: 1 % (ref 0–1)
BILIRUB SERPL-MCNC: 0.2 MG/DL (ref 0.2–1)
BUN SERPL-MCNC: 7 MG/DL (ref 6–20)
BUN/CREAT SERPL: 9 (ref 12–20)
CA-I BLD-MCNC: 9.4 MG/DL (ref 8.5–10.1)
CALCULATED P AXIS, ECG09: 34 DEGREES
CALCULATED R AXIS, ECG10: 36 DEGREES
CALCULATED T AXIS, ECG11: 47 DEGREES
CHLORIDE SERPL-SCNC: 109 MMOL/L (ref 97–108)
CO2 SERPL-SCNC: 24 MMOL/L (ref 21–32)
CREAT SERPL-MCNC: 0.8 MG/DL (ref 0.55–1.02)
D DIMER PPP FEU-MCNC: 0.45 UG/ML(FEU)
DIAGNOSIS, 93000: NORMAL
DIFFERENTIAL METHOD BLD: ABNORMAL
EOSINOPHIL # BLD: 1 K/UL (ref 0–0.4)
EOSINOPHIL NFR BLD: 6 % (ref 0–7)
ERYTHROCYTE [DISTWIDTH] IN BLOOD BY AUTOMATED COUNT: 14.3 % (ref 11.5–14.5)
GLOBULIN SER CALC-MCNC: 3.4 G/DL (ref 2–4)
GLUCOSE SERPL-MCNC: 104 MG/DL (ref 65–100)
HCT VFR BLD AUTO: 37.4 % (ref 35–47)
HGB BLD-MCNC: 11.9 G/DL (ref 11.5–16)
IMM GRANULOCYTES # BLD AUTO: 0 K/UL
IMM GRANULOCYTES NFR BLD AUTO: 0 %
LYMPHOCYTES # BLD: 3.6 K/UL (ref 0.8–3.5)
LYMPHOCYTES NFR BLD: 21 % (ref 12–49)
MCH RBC QN AUTO: 27.2 PG (ref 26–34)
MCHC RBC AUTO-ENTMCNC: 31.8 G/DL (ref 30–36.5)
MCV RBC AUTO: 85.4 FL (ref 80–99)
MONOCYTES # BLD: 1.4 K/UL (ref 0–1)
MONOCYTES NFR BLD: 8 % (ref 5–13)
NEUTS SEG # BLD: 10.5 K/UL (ref 1.8–8)
NEUTS SEG NFR BLD: 62 % (ref 32–75)
NRBC # BLD: 0 K/UL (ref 0–0.01)
NRBC BLD-RTO: 0 PER 100 WBC
OTHER CELLS NFR BLD MANUAL: 2 %
P-R INTERVAL, ECG05: 158 MS
PLATELET # BLD AUTO: 398 K/UL (ref 150–400)
PMV BLD AUTO: 11 FL (ref 8.9–12.9)
POTASSIUM SERPL-SCNC: 4.5 MMOL/L (ref 3.5–5.1)
PROT SERPL-MCNC: 7 G/DL (ref 6.4–8.2)
Q-T INTERVAL, ECG07: 390 MS
QRS DURATION, ECG06: 86 MS
QTC CALCULATION (BEZET), ECG08: 405 MS
RBC # BLD AUTO: 4.38 M/UL (ref 3.8–5.2)
RBC MORPH BLD: ABNORMAL
SODIUM SERPL-SCNC: 137 MMOL/L (ref 136–145)
TROPONIN-HIGH SENSITIVITY: 4 NG/L (ref 0–51)
TROPONIN-HIGH SENSITIVITY: 5 NG/L (ref 0–51)
VENTRICULAR RATE, ECG03: 65 BPM
WBC # BLD AUTO: 17 K/UL (ref 3.6–11)

## 2022-10-09 PROCEDURE — 93005 ELECTROCARDIOGRAM TRACING: CPT

## 2022-10-09 PROCEDURE — 74011250636 HC RX REV CODE- 250/636: Performed by: STUDENT IN AN ORGANIZED HEALTH CARE EDUCATION/TRAINING PROGRAM

## 2022-10-09 PROCEDURE — 85379 FIBRIN DEGRADATION QUANT: CPT

## 2022-10-09 PROCEDURE — 99285 EMERGENCY DEPT VISIT HI MDM: CPT

## 2022-10-09 PROCEDURE — 36415 COLL VENOUS BLD VENIPUNCTURE: CPT

## 2022-10-09 PROCEDURE — 85025 COMPLETE CBC W/AUTO DIFF WBC: CPT

## 2022-10-09 PROCEDURE — 80053 COMPREHEN METABOLIC PANEL: CPT

## 2022-10-09 PROCEDURE — 71045 X-RAY EXAM CHEST 1 VIEW: CPT

## 2022-10-09 PROCEDURE — 84484 ASSAY OF TROPONIN QUANT: CPT

## 2022-10-09 PROCEDURE — 96374 THER/PROPH/DIAG INJ IV PUSH: CPT

## 2022-10-09 RX ORDER — KETOROLAC TROMETHAMINE 30 MG/ML
15 INJECTION, SOLUTION INTRAMUSCULAR; INTRAVENOUS
Status: COMPLETED | OUTPATIENT
Start: 2022-10-09 | End: 2022-10-09

## 2022-10-09 RX ADMIN — KETOROLAC TROMETHAMINE 15 MG: 30 INJECTION, SOLUTION INTRAMUSCULAR at 02:07

## 2022-10-09 NOTE — ED PROVIDER NOTES
CHEST PAIN - EMERGENCY DEPARTMENT HISTORY AND PHYSICAL EXAM      Date: 10/9/2022  Patient Name: Tonia Kaur    History of Presenting Illness     Chief Complaint   Patient presents with    Chest Pain       History Provided By: Patient    Chief Complaint: chest pain  Duration: 1 Hours  Timing:  Acute  Location: midsternal with radiation to back  Quality: Stabbing and Cramping  Severity: Moderate  Modifying Factors: Denies  Associated Symptoms: denies any other associated signs or symptoms      Additional History (Context): Tonia Kaur is a 40 y.o. female with hypertension who presents with chest pain midsternal radiation to back. Patient states she woke up approximately 1230 with sharp severe chest pain midsternum with occasional radiation to back. Describes as constant, nonexertional, no exacerbating or alleviating factors. Denies any shortness of breath no recent fevers chills, no abdominal pain nausea or vomiting    PCP: Reza Hewitt, DO    Current Outpatient Medications   Medication Sig Dispense Refill    cyclobenzaprine (FLEXERIL) 10 mg tablet Take 1 Tablet by mouth two (2) times daily as needed for Muscle Spasm(s) for up to 3 days. 6 Tablet 0    naproxen (Naprosyn) 500 mg tablet Take 1 Tablet by mouth two (2) times daily (with meals) for 5 days. 10 Tablet 0    ibuprofen (MOTRIN) 800 mg tablet Take 1 Tablet by mouth every eight (8) hours. 40 Tablet 0    sertraline HCl (ZOLOFT PO) Take  by mouth.  labetaloL (NORMODYNE) 200 mg tablet Take 200 mg by mouth daily.            Past History     Past Medical History:  Past Medical History:   Diagnosis Date    Anxiety     Asthma     Bronchitis     Depression     Epilepsy ([facility] [address]     Essential hypertension affecting pregnancy in third trimester 9/19/2021    Obesity     Postpartum depression        Past Surgical History:  Past Surgical History:   Procedure Laterality Date    HX OTHER SURGICAL      wisdom teeth, D&C, tonsillectomy Family History:  History reviewed. No pertinent family history. Social History:  Social History     Tobacco Use    Smoking status: Every Day     Packs/day: 0.50     Types: Cigarettes    Smokeless tobacco: Never   Vaping Use    Vaping Use: Never used   Substance Use Topics    Alcohol use: Not Currently    Drug use: Never       Allergies: Allergies   Allergen Reactions    Sulfa (Sulfonamide Antibiotics) Not Reported This Time         Review of Systems   Review of Systems   Constitutional:  Negative for activity change, chills and fever. HENT:  Negative for congestion and sore throat. Eyes:  Negative for photophobia and visual disturbance. Respiratory:  Positive for chest tightness. Negative for apnea and shortness of breath. Cardiovascular:  Positive for chest pain. Negative for palpitations and leg swelling. Gastrointestinal:  Negative for abdominal pain, blood in stool, nausea and vomiting. Genitourinary:  Negative for dysuria. Musculoskeletal:  Negative for arthralgias and back pain. Skin:  Negative for color change. Neurological:  Negative for dizziness, weakness, numbness and headaches. Physical Exam   Physical Exam  Vitals and nursing note reviewed. Constitutional:       Appearance: She is well-developed. She is obese. HENT:      Head: Normocephalic and atraumatic. Eyes:      Extraocular Movements: Extraocular movements intact. Neck:      Vascular: No JVD. Cardiovascular:      Rate and Rhythm: Normal rate and regular rhythm. Pulses:           Radial pulses are 2+ on the right side and 2+ on the left side. Dorsalis pedis pulses are 2+ on the right side and 2+ on the left side. Heart sounds: Normal heart sounds. Pulmonary:      Effort: Pulmonary effort is normal.      Breath sounds: Normal breath sounds. Chest:      Chest wall: No tenderness. Abdominal:      General: Bowel sounds are normal.      Palpations: Abdomen is soft. Musculoskeletal:      Cervical back: Normal range of motion and neck supple. Right lower leg: No edema. Left lower leg: No edema. Skin:     General: Skin is warm and dry. Neurological:      General: No focal deficit present. Mental Status: She is alert and oriented to person, place, and time. Psychiatric:         Mood and Affect: Mood normal.         Behavior: Behavior normal.       Diagnostic Study Results     Labs -     Recent Results (from the past 12 hour(s))   CBC WITH AUTOMATED DIFF    Collection Time: 10/09/22  1:19 AM   Result Value Ref Range    WBC 17.0 (H) 3.6 - 11.0 K/uL    RBC 4.38 3.80 - 5.20 M/uL    HGB 11.9 11.5 - 16.0 g/dL    HCT 37.4 35.0 - 47.0 %    MCV 85.4 80.0 - 99.0 FL    MCH 27.2 26.0 - 34.0 PG    MCHC 31.8 30.0 - 36.5 g/dL    RDW 14.3 11.5 - 14.5 %    PLATELET 616 748 - 967 K/uL    MPV 11.0 8.9 - 12.9 FL    NRBC 0.0 0.0  WBC    ABSOLUTE NRBC 0.00 0.00 - 0.01 K/uL    NEUTROPHILS 62 32 - 75 %    LYMPHOCYTES 21 12 - 49 %    MONOCYTES 8 5 - 13 %    EOSINOPHILS 6 0 - 7 %    BASOPHILS 1 0 - 1 %    OTHER CELL 2 %    IMMATURE GRANULOCYTES 0 %    ABS. NEUTROPHILS 10.5 (H) 1.8 - 8.0 K/UL    ABS. LYMPHOCYTES 3.6 (H) 0.8 - 3.5 K/UL    ABS. MONOCYTES 1.4 (H) 0.0 - 1.0 K/UL    ABS. EOSINOPHILS 1.0 (H) 0.0 - 0.4 K/UL    ABS. BASOPHILS 0.2 (H) 0.0 - 0.1 K/UL    ABS. IMM.  GRANS. 0.0 K/UL    DF Manual      RBC COMMENTS Normocytic, Normochromic     METABOLIC PANEL, COMPREHENSIVE    Collection Time: 10/09/22  1:19 AM   Result Value Ref Range    Sodium 137 136 - 145 mmol/L    Potassium 4.5 3.5 - 5.1 mmol/L    Chloride 109 (H) 97 - 108 mmol/L    CO2 24 21 - 32 mmol/L    Anion gap 4 (L) 5 - 15 mmol/L    Glucose 104 (H) 65 - 100 mg/dL    BUN 7 6 - 20 mg/dL    Creatinine 0.80 0.55 - 1.02 mg/dL    BUN/Creatinine ratio 9 (L) 12 - 20      eGFR >60 >60 ml/min/1.73m2    Calcium 9.4 8.5 - 10.1 mg/dL    Bilirubin, total 0.2 0.2 - 1.0 mg/dL    AST (SGOT) 16 15 - 37 U/L    ALT (SGPT) 17 12 - 78 U/L    Alk. phosphatase 100 45 - 117 U/L    Protein, total 7.0 6.4 - 8.2 g/dL    Albumin 3.6 3.5 - 5.0 g/dL    Globulin 3.4 2.0 - 4.0 g/dL    A-G Ratio 1.1 1.1 - 2.2     TROPONIN-HIGH SENSITIVITY    Collection Time: 10/09/22  1:19 AM   Result Value Ref Range    Troponin-High Sensitivity 4 0 - 51 ng/L   D DIMER    Collection Time: 10/09/22  1:29 AM   Result Value Ref Range    D DIMER 0.45 <0.50 ug/ml(FEU)   TROPONIN-HIGH SENSITIVITY    Collection Time: 10/09/22  2:09 AM   Result Value Ref Range    Troponin-High Sensitivity 5 0 - 51 ng/L       Radiologic Studies -   XR CHEST PORT   Final Result   No acute process identified. CT Results  (Last 48 hours)      None          CXR Results  (Last 48 hours)                 10/09/22 0138  XR CHEST PORT Final result    Impression:  No acute process identified. Narrative:  Clinical history: chest pain   INDICATION:   chest pain   COMPARISON: 2021       FINDINGS:   AP portable upright view of the chest demonstrates a stable  cardiopericardial   silhouette. There is no pleural effusion. .There is no focal consolidation. .There   is no pneumothorax. . Patient is on a cardiac monitor. Medical Decision Making   I am the first provider for this patient. I reviewed the vital signs, available nursing notes, past medical history, past surgical history, family history and social history. Records Reviewed: Nursing Notes and Old Medical Records      Provider Notes (Medical Decision Making): HEART SCORE:   History: Slightly or Non-suspicious  ECG: Normal  Age: Less than or equal to 45 years  Risk Factors: 1 or 2 risk factors  Troponin: Less than or equal to normal limit   HEART Score Total : 1     Management   Scores 0-3: 0.9-1.7% risk of adverse cardiac event. In the HEART Score study, these patients were discharged (0.99% in the retrospective study, 1.7% in the prospective study)   Scores 4-6: 12-16.6% risk of adverse cardiac event.  In the HEART Score study, these patients were admitted to the hospital. (11.6% retrospective, 16.6% prospective)   Scores =7: 50-65% risk of adverse cardiac event. In the HEART Score study, these patients were candidates for early invasive measures. (65.2% retrospective, 50.1% prospective)   A MACE (Major Adverse Cardiac Event) was defined as all-cause mortality, myocardial infarction, or coronary revascularization. Original/Primary Reference  [employer] SINGLETON, Herb DUNN, Tyler ORDONEZ. Chest pain in the emergency room: value of the HEART score. Neth Heart J. 2008;16(6):191-6. Validation  [facility] Carlos, Tyler ORDONEZ, et al. A prospective validation of the HEART score for chest pain patients at the emergency department. Int J Cardiol. 2013;168(3):2153-8. [facility] Carlos, Johnny West et al. Chest pain in the emergency room: a multicenter validation of the HEART Score. Crit Pathw Cardiol. 2010;9(3):164-9. Ammy SMITH, Brett ANDERSEN, Ladonna SCHUMACHER, et al. The HEART Pathway Randomized Controlled Trial One Year Outcomes. Acad Emerg Med. 2018;     ED Course:   Initial assessment performed. The patients presenting problems have been discussed, and they are in agreement with the care plan formulated and outlined with them. I have encouraged them to ask questions as they arise throughout their visit. At this point time my differential includes:  Unstable Angina  Congestive Heart Failure  Pulmonary Embolism  Aortic Dissection  Chest wall pain  Costochrondritis  Esophageal Spasm    My evaluation plan includes:  Pain and nausea control  EKG  Chest X-ray  Blook work  Observation and/or repeart EKG or Troponin    Vital Signs-Reviewed the patient's vital signs.   Patient Vitals for the past 12 hrs:   Temp Pulse Resp BP SpO2   10/09/22 0315 -- 75 -- (!) 156/90 97 %   10/09/22 0300 -- 73 -- (!) 151/91 96 %   10/09/22 0215 -- 66 -- (!) 177/88 98 %   10/09/22 0130 -- 70 -- (!) 177/87 98 %   10/09/22 0115 -- 69 -- (!) 182/98 99 %   10/09/22 0103 -- 75 -- (!) 186/89 99 %   10/09/22 0055 98.1 °F (36.7 °C) 67 18 (!) 198/101 99 %       EKG interpretation: (Preliminary)  Normal sinus rhythm 65, no ST elevation, normal axis    ED Course as of 10/09/22 0402   Sun Oct 09, 2022   0157 D DIMER: 0.45 [PZ]   0158 D DIMER: 0.45 [PZ]   0158 Troponin-High Sensitivity: 4 [PZ]   0201 Given nature of pain and hypertension aortic dissection on differential patient has equal pulses in bilateral radial and DP pulses. D-dimer within normal limits, chest x-ray shows no acute abnormalities, effectively ruling out dissection, and PE. Patient's first set of troponins 4, suspect esophageal spasm versus musculoskeletal pain, will administer Toradol, repeat troponin. [PZ]   8425 Patient's repeat troponin 5. Patient reassessed states that her pain has resolved. At this time suspect most likely muscle strain and/or GI etiology of pain. Instructed patient to follow-up with primary care physician next week, return to emergency department if any worsening chest pain, shortness of breath. [PZ]      ED Course User Index  [PZ] Jalen Reynolds MD         Procedures     Lacey Van MD          Disposition:    Discharged    Remove if not discharged  DISCHARGE PLAN:  1. Current Discharge Medication List        CONTINUE these medications which have NOT CHANGED    Details   cyclobenzaprine (FLEXERIL) 10 mg tablet Take 1 Tablet by mouth two (2) times daily as needed for Muscle Spasm(s) for up to 3 days. Qty: 6 Tablet, Refills: 0      naproxen (Naprosyn) 500 mg tablet Take 1 Tablet by mouth two (2) times daily (with meals) for 5 days. Qty: 10 Tablet, Refills: 0      ibuprofen (MOTRIN) 800 mg tablet Take 1 Tablet by mouth every eight (8) hours. Qty: 40 Tablet, Refills: 0    Associated Diagnoses: Postoperative pain      sertraline HCl (ZOLOFT PO) Take  by mouth. labetaloL (NORMODYNE) 200 mg tablet Take 200 mg by mouth daily.            2.   Follow-up Information       Follow up With Specialties Details Why Contact Info    Lise Barcenas, DO Family Medicine In 3 days As needed [address]  [phone]            3. Return to ED if worse     Diagnosis     Clinical Impression:   1. Chest wall pain        Attestations:    Luke Sampson MD    Please note that this dictation was completed with ASYM III, the computer voice recognition software. Quite often unanticipated grammatical, syntax, homophones, and other interpretive errors are inadvertently transcribed by the computer software. Please disregard these errors. Please excuse any errors that have escaped final proofreading. Thank you.

## 2022-12-25 ENCOUNTER — HOSPITAL ENCOUNTER (EMERGENCY)
Age: 37
Discharge: HOME | End: 2022-12-25
Attending: STUDENT IN AN ORGANIZED HEALTH CARE EDUCATION/TRAINING PROGRAM
Payer: MEDICAID

## 2022-12-25 VITALS
SYSTOLIC BLOOD PRESSURE: 139 MMHG | HEIGHT: 65 IN | HEART RATE: 72 BPM | RESPIRATION RATE: 18 BRPM | OXYGEN SATURATION: 98 % | WEIGHT: 270 LBS | BODY MASS INDEX: 44.98 KG/M2 | TEMPERATURE: 97.8 F | DIASTOLIC BLOOD PRESSURE: 82 MMHG

## 2022-12-25 DIAGNOSIS — J06.9 UPPER RESPIRATORY TRACT INFECTION, UNSPECIFIED TYPE: Primary | ICD-10-CM

## 2022-12-25 PROCEDURE — 74011250637 HC RX REV CODE- 250/637: Performed by: STUDENT IN AN ORGANIZED HEALTH CARE EDUCATION/TRAINING PROGRAM

## 2022-12-25 PROCEDURE — 99283 EMERGENCY DEPT VISIT LOW MDM: CPT

## 2022-12-25 RX ORDER — DEXAMETHASONE SODIUM PHOSPHATE 10 MG/ML
6 INJECTION INTRAMUSCULAR; INTRAVENOUS
Status: COMPLETED | OUTPATIENT
Start: 2022-12-25 | End: 2022-12-25

## 2022-12-25 RX ADMIN — DEXAMETHASONE SODIUM PHOSPHATE 6 MG: 10 INJECTION INTRAMUSCULAR; INTRAVENOUS at 00:54

## 2022-12-25 NOTE — ED TRIAGE NOTES
Patient states she has been sick for 4 weeks; dx with sinus infection, given antibiotics, and she has worsening sx; pt with hoarse voice    Symptoms: coughing, sinus pressure, sore throat    Flu and COVID negative x1 week

## 2022-12-25 NOTE — ED PROVIDER NOTES
[facility]  EMERGENCY DEPARTMENT ENCOUNTER NOTE        Date: 12/25/2022  Patient Name: Timbo Rodriguez      History of Presenting Illness     Chief Complaint   Patient presents with    Flu Like Symptoms       History Provided By: Patient    HPI: Timbo Rodriguez, 40 y.o. female with PMH of asthma, anxiety, depression, and hypertension presents to the ED with sore throat. Patient was recently diagnosed with URI and sinusitis and finished a course of antibiotics. She comes to the ED due to dysphonia. She is denying dysphagia, drooling, fever or chills. Continues to have runny nose, nasal congestion, intermittent coughing and sinus pressure. Mild to moderate in severity, no known aggravating leaving factors or associated symptoms. She had COVID and flu swab done last week which was negative. Otherwise she denies any changes in her bowel, dietary, or urinary habits. PCP: Yolanda Garcia, DO    Current Outpatient Medications   Medication Sig Dispense Refill    ibuprofen (MOTRIN) 800 mg tablet Take 1 Tablet by mouth every eight (8) hours. 40 Tablet 0    sertraline HCl (ZOLOFT PO) Take  by mouth. labetaloL (NORMODYNE) 200 mg tablet Take 200 mg by mouth daily. Past History     Past Medical History:  Past Medical History:   Diagnosis Date    Anxiety     Asthma     Bronchitis     Depression     Hypertension     Obesity     Postpartum depression        Past Surgical History:  Past Surgical History:   Procedure Laterality Date    HX OTHER SURGICAL      wisdom teeth, D&C, tonsillectomy    HX TONSILLECTOMY         Family History:  History reviewed. No pertinent family history. Social History:  Social History     Tobacco Use    Smoking status: Every Day     Packs/day: 0.50     Types: Cigarettes    Smokeless tobacco: Never   Vaping Use    Vaping Use: Never used   Substance Use Topics    Alcohol use: Not Currently    Drug use: Never       Allergies:   Allergies Allergen Reactions    Sulfa (Sulfonamide Antibiotics) Not Reported This Time         Review of Systems     Review of Systems    A 10 point review of system was performed and was negative except as noted above in HPI    Physical Exam     Physical Exam  Vitals and nursing note reviewed. Constitutional:       General: She is not in acute distress. Appearance: She is well-developed. She is not diaphoretic. HENT:      Head: Normocephalic and atraumatic. Nose: Congestion and rhinorrhea present. Mouth/Throat:      Mouth: Mucous membranes are moist.      Pharynx: Oropharynx is clear. Eyes:      Extraocular Movements: Extraocular movements intact. Conjunctiva/sclera: Conjunctivae normal.   Cardiovascular:      Rate and Rhythm: Normal rate and regular rhythm. Heart sounds: Normal heart sounds. Pulmonary:      Effort: Pulmonary effort is normal.      Breath sounds: Normal breath sounds. Abdominal:      Palpations: Abdomen is soft. Tenderness: There is no abdominal tenderness. Musculoskeletal:      Cervical back: Neck supple. Right lower leg: No tenderness. No edema. Left lower leg: No tenderness. No edema. Neurological:      General: No focal deficit present. Mental Status: She is alert and oriented to person, place, and time. Lab and Diagnostic Study Results     Labs -   No results found for this or any previous visit (from the past 12 hour(s)). Radiologic Studies -   [unfilled]  CT Results  (Last 48 hours)      None          CXR Results  (Last 48 hours)      None            Medical Decision Making and ED Course   - I am the first and primary provider for this patient AND AM THE PRIMARY PROVIDER OF RECORD. - I reviewed the vital signs, available nursing notes, past medical history, past surgical history, family history and social history. - Initial assessment performed.  The patients presenting problems have been discussed, and the staff are in agreement with the care plan formulated and outlined with them. I have encouraged them to ask questions as they arise throughout their visit. Vital Signs-Reviewed the patient's vital signs. Patient Vitals for the past 24 hrs:   Temp Pulse Resp BP SpO2   12/25/22 0237 -- 72 -- 139/82 98 %   12/25/22 0036 97.8 °F (36.6 °C) 87 18 (!) 177/100 97 %       Records Reviewed: Nursing Notes    Provider Notes (Medical Decision Making):     Pt presents with acute URI symptoms. Pt is well-appearing with stable vitals and a reassuring exam; symptoms are consistent with an uncomplicated URI. DDx: acute bronchitis, bacterial sinusitis vs. pharyngitis, migraine, flu, COVID-19 URI. On clinical exam, the patient does not have peritonsillar swelling, voice chances or uvula deviation to suggest peritonsillar abscess. There is no drooling, tripodding, voice changes, dysphagia/odynophagia, or difficulty breathing to suggest epiglottitis. There are no voices changes, bulging to back of the throat, dysphagia/odynophagia, difficulty with flexing/extending neck to indicate retropharyngeal abscess. There is no induration to the submental area to suggest Gordy's angina. Symptomatic therapy suggested: acetaminophen, ibuprofen, antihistamine-decongestant of choice, cough suppressant of choice. Increase fluids, use a vaporizer, stay in steamy bathroom TID 15 min PRN severe cough, acetaminophen as needed, rest, avoid smoky areas. Symptomatic therapy suggested:  Gargle for sore throat; use mist at the bedside for congestion. Apply warm facial packs for sinus pain or use nasal saline sprays. Follow up with PMD within 3 days for reevaluation    Diagnosis     Clinical Impression:   1. Upper respiratory tract infection, unspecified type        Disposition     Disposition: Condition stable  DC- Adult Discharges: All of the diagnostic tests were reviewed and questions answered.  Diagnosis, care plan and treatment options were discussed. The patient understands the instructions and will follow up as directed. The patients results have been reviewed with them. They have been counseled regarding their diagnosis. The patient verbally convey understanding and agreement of the signs, symptoms, diagnosis, treatment and prognosis and additionally agrees to follow up as recommended with their PCP in 24 - 48 hours. They also agree with the care-plan and convey that all of their questions have been answered. I have also put together some discharge instructions for them that include: 1) educational information regarding their diagnosis, 2) how to care for their diagnosis at home, as well a 3) list of reasons why they would want to return to the ED prior to their follow-up appointment, should their condition change. Discharged      DISCHARGE PLAN:  1. Follow-up Information       Follow up With Specialties Details Why [address] EMERGENCY DEPT Emergency Medicine Go to  As needed, If symptoms worsen [address] [facility] [id number] [place] [address], [address] [place] [address], DO Family Medicine In 3 days For reevaluation [address] [address] [phone]            2. Return to ED if worse   3. Current Discharge Medication List            Attestations: Skye Ferreira MD    Please note that this dictation was completed with Know[employer], the computer voice recognition software. Quite often unanticipated grammatical, syntax, homophones, and other interpretive errors are inadvertently transcribed by the computer software. Please disregard these errors. Please excuse any errors that have escaped final proofreading. Thank you.

## 2023-10-22 ENCOUNTER — HOSPITAL ENCOUNTER (EMERGENCY)
Facility: HOSPITAL | Age: 38
Discharge: HOME | End: 2023-10-23
Attending: STUDENT IN AN ORGANIZED HEALTH CARE EDUCATION/TRAINING PROGRAM
Payer: MEDICAID

## 2023-10-22 DIAGNOSIS — E87.6 HYPOKALEMIA: ICD-10-CM

## 2023-10-22 DIAGNOSIS — R11.0 NAUSEA: Primary | ICD-10-CM

## 2023-10-22 DIAGNOSIS — R19.5 LOOSE STOOLS: ICD-10-CM

## 2023-10-22 LAB
ALBUMIN SERPL-MCNC: 3.7 G/DL (ref 3.5–5)
ALBUMIN/GLOB SERPL: 0.9 (ref 1.1–2.2)
ALP SERPL-CCNC: 122 U/L (ref 45–117)
ALT SERPL-CCNC: 24 U/L (ref 12–78)
ANION GAP SERPL CALC-SCNC: 7 MMOL/L (ref 5–15)
AST SERPL W P-5'-P-CCNC: 11 U/L (ref 15–37)
BASOPHILS # BLD: 0 K/UL (ref 0–0.1)
BASOPHILS NFR BLD: 0 % (ref 0–1)
BILIRUB SERPL-MCNC: 0.3 MG/DL (ref 0.2–1)
BUN SERPL-MCNC: 7 MG/DL (ref 6–20)
BUN/CREAT SERPL: 7 (ref 12–20)
CA-I BLD-MCNC: 9.6 MG/DL (ref 8.5–10.1)
CHLORIDE SERPL-SCNC: 108 MMOL/L (ref 97–108)
CO2 SERPL-SCNC: 22 MMOL/L (ref 21–32)
CREAT SERPL-MCNC: 1.05 MG/DL (ref 0.55–1.02)
DIFFERENTIAL METHOD BLD: ABNORMAL
EOSINOPHIL # BLD: 0.5 K/UL (ref 0–0.4)
EOSINOPHIL NFR BLD: 3 % (ref 0–7)
ERYTHROCYTE [DISTWIDTH] IN BLOOD BY AUTOMATED COUNT: 15.5 % (ref 11.5–14.5)
GLOBULIN SER CALC-MCNC: 4.2 G/DL (ref 2–4)
GLUCOSE SERPL-MCNC: 124 MG/DL (ref 65–100)
HCT VFR BLD AUTO: 41.7 % (ref 35–47)
HGB BLD-MCNC: 13.9 G/DL (ref 11.5–16)
IMM GRANULOCYTES # BLD AUTO: 0 K/UL
IMM GRANULOCYTES NFR BLD AUTO: 0 %
LIPASE SERPL-CCNC: 18 U/L (ref 13–75)
LYMPHOCYTES # BLD: 4.8 K/UL (ref 0.8–3.5)
LYMPHOCYTES NFR BLD: 27 % (ref 12–49)
MAGNESIUM SERPL-MCNC: 1.8 MG/DL (ref 1.6–2.4)
MCH RBC QN AUTO: 27.5 PG (ref 26–34)
MCHC RBC AUTO-ENTMCNC: 33.3 G/DL (ref 30–36.5)
MCV RBC AUTO: 82.4 FL (ref 80–99)
MONOCYTES # BLD: 1.4 K/UL (ref 0–1)
MONOCYTES NFR BLD: 8 % (ref 5–13)
NEUTS SEG # BLD: 11 K/UL (ref 1.8–8)
NEUTS SEG NFR BLD: 62 % (ref 32–75)
NRBC # BLD: 0 K/UL (ref 0–0.01)
NRBC BLD-RTO: 0 PER 100 WBC
PLATELET # BLD AUTO: 436 K/UL (ref 150–400)
PMV BLD AUTO: 10.9 FL (ref 8.9–12.9)
POTASSIUM SERPL-SCNC: 3.1 MMOL/L (ref 3.5–5.1)
PROT SERPL-MCNC: 7.9 G/DL (ref 6.4–8.2)
RBC # BLD AUTO: 5.06 M/UL (ref 3.8–5.2)
RBC MORPH BLD: ABNORMAL
SODIUM SERPL-SCNC: 137 MMOL/L (ref 136–145)
WBC # BLD AUTO: 17.7 K/UL (ref 3.6–11)

## 2023-10-22 PROCEDURE — 2580000003 HC RX 258: Performed by: STUDENT IN AN ORGANIZED HEALTH CARE EDUCATION/TRAINING PROGRAM

## 2023-10-22 PROCEDURE — 80053 COMPREHEN METABOLIC PANEL: CPT

## 2023-10-22 PROCEDURE — 36415 COLL VENOUS BLD VENIPUNCTURE: CPT

## 2023-10-22 PROCEDURE — 83735 ASSAY OF MAGNESIUM: CPT

## 2023-10-22 PROCEDURE — 6360000002 HC RX W HCPCS: Performed by: STUDENT IN AN ORGANIZED HEALTH CARE EDUCATION/TRAINING PROGRAM

## 2023-10-22 PROCEDURE — 96361 HYDRATE IV INFUSION ADD-ON: CPT

## 2023-10-22 PROCEDURE — 85025 COMPLETE CBC W/AUTO DIFF WBC: CPT

## 2023-10-22 PROCEDURE — 93005 ELECTROCARDIOGRAM TRACING: CPT | Performed by: STUDENT IN AN ORGANIZED HEALTH CARE EDUCATION/TRAINING PROGRAM

## 2023-10-22 PROCEDURE — 99284 EMERGENCY DEPT VISIT MOD MDM: CPT

## 2023-10-22 PROCEDURE — 96374 THER/PROPH/DIAG INJ IV PUSH: CPT

## 2023-10-22 PROCEDURE — 99283 EMERGENCY DEPT VISIT LOW MDM: CPT

## 2023-10-22 PROCEDURE — 83690 ASSAY OF LIPASE: CPT

## 2023-10-22 RX ORDER — POTASSIUM CHLORIDE 750 MG/1
40 TABLET, FILM COATED, EXTENDED RELEASE ORAL ONCE
Status: COMPLETED | OUTPATIENT
Start: 2023-10-23 | End: 2023-10-23

## 2023-10-22 RX ORDER — 0.9 % SODIUM CHLORIDE 0.9 %
1000 INTRAVENOUS SOLUTION INTRAVENOUS ONCE
Status: COMPLETED | OUTPATIENT
Start: 2023-10-22 | End: 2023-10-23

## 2023-10-22 RX ORDER — ONDANSETRON 2 MG/ML
4 INJECTION INTRAMUSCULAR; INTRAVENOUS ONCE
Status: COMPLETED | OUTPATIENT
Start: 2023-10-22 | End: 2023-10-22

## 2023-10-22 RX ADMIN — ONDANSETRON 4 MG: 2 INJECTION INTRAMUSCULAR; INTRAVENOUS at 23:03

## 2023-10-22 RX ADMIN — SODIUM CHLORIDE 1000 ML: 9 INJECTION, SOLUTION INTRAVENOUS at 23:04

## 2023-10-22 ASSESSMENT — PAIN - FUNCTIONAL ASSESSMENT: PAIN_FUNCTIONAL_ASSESSMENT: 0-10

## 2023-10-22 ASSESSMENT — PAIN DESCRIPTION - LOCATION: LOCATION: BACK

## 2023-10-22 ASSESSMENT — PAIN SCALES - GENERAL: PAINLEVEL_OUTOF10: 3

## 2023-10-23 VITALS
TEMPERATURE: 98.2 F | HEIGHT: 65 IN | OXYGEN SATURATION: 95 % | RESPIRATION RATE: 19 BRPM | SYSTOLIC BLOOD PRESSURE: 154 MMHG | HEART RATE: 77 BPM | BODY MASS INDEX: 40.48 KG/M2 | WEIGHT: 243 LBS | DIASTOLIC BLOOD PRESSURE: 90 MMHG

## 2023-10-23 LAB
APPEARANCE UR: ABNORMAL
BACTERIA URNS QL MICRO: NEGATIVE /HPF
BILIRUB UR QL: NEGATIVE
COLOR UR: ABNORMAL
EKG ATRIAL RATE: 115 BPM
EKG DIAGNOSIS: NORMAL
EKG P AXIS: 67 DEGREES
EKG P-R INTERVAL: 158 MS
EKG Q-T INTERVAL: 326 MS
EKG QRS DURATION: 76 MS
EKG QTC CALCULATION (BAZETT): 450 MS
EKG R AXIS: 45 DEGREES
EKG T AXIS: 68 DEGREES
EKG VENTRICULAR RATE: 115 BPM
GLUCOSE UR STRIP.AUTO-MCNC: NEGATIVE MG/DL
HCG UR QL: NEGATIVE
HGB UR QL STRIP: ABNORMAL
KETONES UR QL STRIP.AUTO: NEGATIVE MG/DL
LEUKOCYTE ESTERASE UR QL STRIP.AUTO: NEGATIVE
NITRITE UR QL STRIP.AUTO: NEGATIVE
PH UR STRIP: 6 (ref 5–8)
PROT UR STRIP-MCNC: NEGATIVE MG/DL
RBC #/AREA URNS HPF: NORMAL /HPF (ref 0–5)
SP GR UR REFRACTOMETRY: 1.01 (ref 1–1.03)
UROBILINOGEN UR QL STRIP.AUTO: 0.1 EU/DL (ref 0.1–1)
WBC URNS QL MICRO: NORMAL /HPF (ref 0–4)

## 2023-10-23 PROCEDURE — 81001 URINALYSIS AUTO W/SCOPE: CPT

## 2023-10-23 PROCEDURE — 81025 URINE PREGNANCY TEST: CPT

## 2023-10-23 PROCEDURE — 6370000000 HC RX 637 (ALT 250 FOR IP): Performed by: STUDENT IN AN ORGANIZED HEALTH CARE EDUCATION/TRAINING PROGRAM

## 2023-10-23 RX ADMIN — POTASSIUM CHLORIDE 40 MEQ: 750 TABLET, EXTENDED RELEASE ORAL at 00:36

## 2023-10-23 NOTE — ED TRIAGE NOTES
States she has been out of her bp meds for the last 2 to 3 weeks.  States she has had a cough with sob and that when she eats she vomits but has \"real bad diarrhea\"

## 2023-10-23 NOTE — DISCHARGE INSTRUCTIONS
Thank you! Thank you for allowing me to care for you in the emergency department. It is my goal to provide you with excellent care. If you have not received excellent quality care, please ask to speak to the nurse manager. Please fill out the survey that will come to you by mail or email since we listen to your feedback! Below you will find a list of your tests from today's visit. Should you have any questions, please do not hesitate to call the emergency department.     Labs  Recent Results (from the past 12 hour(s))   CBC with Auto Differential    Collection Time: 10/22/23 10:50 PM   Result Value Ref Range    WBC 17.7 (H) 3.6 - 11.0 K/uL    RBC 5.06 3.80 - 5.20 M/uL    Hemoglobin 13.9 11.5 - 16.0 g/dL    Hematocrit 41.7 35.0 - 47.0 %    MCV 82.4 80.0 - 99.0 FL    MCH 27.5 26.0 - 34.0 PG    MCHC 33.3 30.0 - 36.5 g/dL    RDW 15.5 (H) 11.5 - 14.5 %    Platelets 962 (H) 955 - 400 K/uL    MPV 10.9 8.9 - 12.9 FL    Nucleated RBCs 0.0 0.0  WBC    nRBC 0.00 0.00 - 0.01 K/uL    Neutrophils % 62 32 - 75 %    Lymphocytes % 27 12 - 49 %    Monocytes % 8 5 - 13 %    Eosinophils % 3 0 - 7 %    Basophils % 0 0 - 1 %    Immature Granulocytes 0 %    Neutrophils Absolute 11.0 (H) 1.8 - 8.0 K/UL    Lymphocytes Absolute 4.8 (H) 0.8 - 3.5 K/UL    Monocytes Absolute 1.4 (H) 0.0 - 1.0 K/UL    Eosinophils Absolute 0.5 (H) 0.0 - 0.4 K/UL    Basophils Absolute 0.0 0.0 - 0.1 K/UL    Absolute Immature Granulocyte 0.0 K/UL    Differential Type Manual      RBC Comment Normocytic, Normochromic     Comprehensive Metabolic Panel    Collection Time: 10/22/23 10:50 PM   Result Value Ref Range    Sodium 137 136 - 145 mmol/L    Potassium 3.1 (L) 3.5 - 5.1 mmol/L    Chloride 108 97 - 108 mmol/L    CO2 22 21 - 32 mmol/L    Anion Gap 7 5 - 15 mmol/L    Glucose 124 (H) 65 - 100 mg/dL    BUN 7 6 - 20 mg/dL    Creatinine 1.05 (H) 0.55 - 1.02 mg/dL    Bun/Cre Ratio 7 (L) 12 - 20      Est, Glom Filt Rate >60 >60 ml/min/1.73m2    Calcium 9.6
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
## 2023-10-23 NOTE — ED PROVIDER NOTES
bolus 1,000 mL  1,000 mL IntraVENous Once Frieda Guo MD         Current Outpatient Medications   Medication Sig Dispense Refill    ibuprofen (ADVIL;MOTRIN) 800 MG tablet Take 800 mg by mouth in the morning and 800 mg at noon and 800 mg in the evening. labetalol (NORMODYNE) 200 MG tablet Take 200 mg by mouth daily         Social Determinants of Health:   Social Determinants of Health     Tobacco Use: High Risk (10/22/2023)    Patient History     Smoking Tobacco Use: Every Day     Smokeless Tobacco Use: Never     Passive Exposure: Not on file   Alcohol Use: Not on file   Financial Resource Strain: Not on file   Food Insecurity: Not on file   Transportation Needs: Not on file   Physical Activity: Not on file   Stress: Not on file   Social Connections: Not on file   Intimate Partner Violence: Not on file   Depression: Not on file   Housing Stability: Not on file       PHYSICAL EXAM   Physical Exam  Constitutional:       General: She is not in acute distress. Appearance: She is well-developed. She is obese. She is not toxic-appearing. Cardiovascular:      Rate and Rhythm: Tachycardia present. Pulses: Normal pulses. Pulmonary:      Effort: Pulmonary effort is normal. No tachypnea. Breath sounds: Normal breath sounds. Abdominal:      Palpations: Abdomen is soft. Tenderness: There is no abdominal tenderness. Musculoskeletal:      Right lower leg: No tenderness. No edema. Left lower leg: No tenderness. No edema. Neurological:      Mental Status: She is alert and oriented to person, place, and time. SCREENINGS                  LAB, EKG AND DIAGNOSTIC RESULTS   Labs:  No results found for this or any previous visit (from the past 12 hour(s)).     EKG: See ED Course Below    Radiologic Studies:  Non-plain film images such as CT, Ultrasound and MRI are read by the radiologist. Plain radiographic images are visualized and preliminarily interpreted by the ED Physician with

## 2023-11-16 ENCOUNTER — HOSPITAL ENCOUNTER (EMERGENCY)
Facility: HOSPITAL | Age: 38
Discharge: HOME | End: 2023-11-16

## 2023-11-16 VITALS
DIASTOLIC BLOOD PRESSURE: 80 MMHG | TEMPERATURE: 98.5 F | SYSTOLIC BLOOD PRESSURE: 145 MMHG | OXYGEN SATURATION: 99 % | RESPIRATION RATE: 16 BRPM | WEIGHT: 245 LBS | HEIGHT: 65 IN | HEART RATE: 89 BPM | BODY MASS INDEX: 40.82 KG/M2

## 2023-11-16 DIAGNOSIS — J06.9 ACUTE UPPER RESPIRATORY INFECTION: Primary | ICD-10-CM

## 2023-11-16 LAB
DEPRECATED S PYO AG THROAT QL EIA: NEGATIVE
FLUAV AG NPH QL IA: NEGATIVE
FLUBV AG NOSE QL IA: NEGATIVE
SARS-COV-2 RDRP RESP QL NAA+PROBE: NOT DETECTED

## 2023-11-16 PROCEDURE — 96372 THER/PROPH/DIAG INJ SC/IM: CPT

## 2023-11-16 PROCEDURE — 6360000002 HC RX W HCPCS

## 2023-11-16 PROCEDURE — 87070 CULTURE OTHR SPECIMN AEROBIC: CPT

## 2023-11-16 PROCEDURE — 99284 EMERGENCY DEPT VISIT MOD MDM: CPT

## 2023-11-16 PROCEDURE — 87880 STREP A ASSAY W/OPTIC: CPT

## 2023-11-16 PROCEDURE — 87635 SARS-COV-2 COVID-19 AMP PRB: CPT

## 2023-11-16 PROCEDURE — 6370000000 HC RX 637 (ALT 250 FOR IP)

## 2023-11-16 PROCEDURE — 87804 INFLUENZA ASSAY W/OPTIC: CPT

## 2023-11-16 RX ORDER — ACETAMINOPHEN 500 MG
1000 TABLET ORAL
Status: COMPLETED | OUTPATIENT
Start: 2023-11-16 | End: 2023-11-16

## 2023-11-16 RX ORDER — FLUTICASONE PROPIONATE 50 MCG
2 SPRAY, SUSPENSION (ML) NASAL DAILY
Qty: 16 G | Refills: 0 | Status: SHIPPED | OUTPATIENT
Start: 2023-11-16

## 2023-11-16 RX ORDER — ACETAMINOPHEN 500 MG
1000 TABLET ORAL 3 TIMES DAILY PRN
Qty: 30 TABLET | Refills: 0 | Status: SHIPPED | OUTPATIENT
Start: 2023-11-16 | End: 2023-11-26

## 2023-11-16 RX ORDER — IBUPROFEN 600 MG/1
600 TABLET ORAL 3 TIMES DAILY PRN
Qty: 30 TABLET | Refills: 0 | Status: SHIPPED | OUTPATIENT
Start: 2023-11-16

## 2023-11-16 RX ORDER — PREDNISONE 50 MG/1
50 TABLET ORAL DAILY
Qty: 5 TABLET | Refills: 0 | Status: SHIPPED | OUTPATIENT
Start: 2023-11-16 | End: 2023-11-21

## 2023-11-16 RX ORDER — KETOROLAC TROMETHAMINE 30 MG/ML
15 INJECTION, SOLUTION INTRAMUSCULAR; INTRAVENOUS ONCE
Status: COMPLETED | OUTPATIENT
Start: 2023-11-16 | End: 2023-11-16

## 2023-11-16 RX ORDER — ONDANSETRON 4 MG/1
4 TABLET, ORALLY DISINTEGRATING ORAL ONCE
Status: COMPLETED | OUTPATIENT
Start: 2023-11-16 | End: 2023-11-16

## 2023-11-16 RX ORDER — GUAIFENESIN/DEXTROMETHORPHAN 100-10MG/5
5 SYRUP ORAL 3 TIMES DAILY PRN
Qty: 120 ML | Refills: 0 | Status: SHIPPED | OUTPATIENT
Start: 2023-11-16 | End: 2023-11-26

## 2023-11-16 RX ADMIN — KETOROLAC TROMETHAMINE 15 MG: 30 INJECTION, SOLUTION INTRAMUSCULAR at 19:36

## 2023-11-16 RX ADMIN — ONDANSETRON 4 MG: 4 TABLET, ORALLY DISINTEGRATING ORAL at 19:37

## 2023-11-16 RX ADMIN — ACETAMINOPHEN 1000 MG: 500 TABLET ORAL at 19:36

## 2023-11-16 ASSESSMENT — PAIN - FUNCTIONAL ASSESSMENT: PAIN_FUNCTIONAL_ASSESSMENT: 0-10

## 2023-11-16 ASSESSMENT — PAIN SCALES - GENERAL
PAINLEVEL_OUTOF10: 5
PAINLEVEL_OUTOF10: 8

## 2023-11-16 ASSESSMENT — PAIN DESCRIPTION - LOCATION: LOCATION: THROAT

## 2023-11-16 ASSESSMENT — LIFESTYLE VARIABLES
HOW OFTEN DO YOU HAVE A DRINK CONTAINING ALCOHOL: NEVER
HOW MANY STANDARD DRINKS CONTAINING ALCOHOL DO YOU HAVE ON A TYPICAL DAY: PATIENT DOES NOT DRINK

## 2023-11-16 NOTE — ED PROVIDER NOTES
[facility] EMERGENCY DEPT  EMERGENCY DEPARTMENT HISTORY AND PHYSICAL EXAM      Date: 11/16/2023  Patient Name: Clark Matamoros  MRN: [id number]  YOB: 1985  Date of evaluation: 11/16/2023  Provider: Deana Julio PA-C   Note Started: 6:46 PM EST 11/16/23    HISTORY OF PRESENT ILLNESS     Chief Complaint   Patient presents with    Pharyngitis    Diarrhea    Nausea       History Provided By: Patient    HPI: Clark Matamoros is a 41 y.o. female past medical history is as below presents emergency department complaining of sore throat, cough, nasal congestion and runny nose for 1 week. Reports that all of her children have been recently sick and everyone is passing around the same virus states that she is tired of dealing with all of the symptoms and just wants to feel better. denies any fevers, chills, night sweats, photophobia, , nausea, vomiting, chest pain, shortness of breath, change in bowel or bladder habits      PAST MEDICAL HISTORY   Past Medical History:  Past Medical History:   Diagnosis Date    Anxiety     Asthma     Bronchitis     Depression     Hypertension     Obesity     Postpartum depression        Past Surgical History:  Past Surgical History:   Procedure Laterality Date    OTHER SURGICAL HISTORY      wisdom teeth, D&C, tonsillectomy    TONSILLECTOMY         Family History:  No family history on file. Social History:  Social History     Tobacco Use    Smoking status: Every Day     Packs/day: .5     Types: Cigarettes    Smokeless tobacco: Never   Substance Use Topics    Alcohol use: Not Currently    Drug use: Never       Allergies: Allergies   Allergen Reactions    Sulfa Antibiotics      Other reaction(s): Not Reported This Time       PCP: Mitzy Díaz DO    Current Meds:   No current facility-administered medications for this encounter.      Current Outpatient Medications   Medication Sig Dispense Refill    predniSONE (DELTASONE) 50 MG tablet Take 1 tablet by mouth daily for 5 days 5 tablet 0

## 2023-11-16 NOTE — ED TRIAGE NOTES
Sore throat that started last night. Has had diarrhea and nausea for approx 1 week.  Generalized body aches

## 2023-11-18 LAB
BACTERIA SPEC CULT: NORMAL
Lab: NORMAL

## 2024-01-03 PROCEDURE — 99283 EMERGENCY DEPT VISIT LOW MDM: CPT

## 2024-01-04 ENCOUNTER — HOSPITAL ENCOUNTER (EMERGENCY)
Facility: HOSPITAL | Age: 39
Discharge: HOME | End: 2024-01-04
Attending: STUDENT IN AN ORGANIZED HEALTH CARE EDUCATION/TRAINING PROGRAM

## 2024-01-04 VITALS
BODY MASS INDEX: 42.49 KG/M2 | SYSTOLIC BLOOD PRESSURE: 138 MMHG | RESPIRATION RATE: 18 BRPM | HEIGHT: 65 IN | WEIGHT: 255 LBS | OXYGEN SATURATION: 99 % | HEART RATE: 97 BPM | DIASTOLIC BLOOD PRESSURE: 91 MMHG | TEMPERATURE: 98 F

## 2024-01-04 DIAGNOSIS — S39.012A STRAIN OF LUMBAR REGION, INITIAL ENCOUNTER: Primary | ICD-10-CM

## 2024-01-04 PROCEDURE — 6370000000 HC RX 637 (ALT 250 FOR IP): Performed by: STUDENT IN AN ORGANIZED HEALTH CARE EDUCATION/TRAINING PROGRAM

## 2024-01-04 RX ORDER — LORAZEPAM 0.5 MG/1
0.5 TABLET ORAL ONCE
Status: COMPLETED | OUTPATIENT
Start: 2024-01-04 | End: 2024-01-04

## 2024-01-04 RX ORDER — METHOCARBAMOL 750 MG/1
1500 TABLET, FILM COATED ORAL 3 TIMES DAILY
Qty: 30 TABLET | Refills: 0 | Status: SHIPPED | OUTPATIENT
Start: 2024-01-04 | End: 2024-01-04

## 2024-01-04 RX ORDER — METHOCARBAMOL 750 MG/1
1500 TABLET, FILM COATED ORAL 3 TIMES DAILY
Qty: 30 TABLET | Refills: 0 | Status: SHIPPED | OUTPATIENT
Start: 2024-01-04 | End: 2024-01-09

## 2024-01-04 RX ADMIN — LORAZEPAM 0.5 MG: 0.5 TABLET ORAL at 00:32

## 2024-01-04 ASSESSMENT — PAIN SCALES - GENERAL
PAINLEVEL_OUTOF10: 8
PAINLEVEL_OUTOF10: 7

## 2024-01-04 ASSESSMENT — PAIN - FUNCTIONAL ASSESSMENT: PAIN_FUNCTIONAL_ASSESSMENT: 0-10

## 2024-01-04 NOTE — ED PROVIDER NOTES
MCG/ACT nasal spray 2 sprays by Each Nostril route daily, Disp-16 g, R-0Normal      labetalol (NORMODYNE) 200 MG tablet Take 200 mg by mouth dailyHistorical Med            2. Jose Baez DO  0 [address] [address]  [phone]    In 1 week      [facility] EMERGENCY DEPT  [address] [place] [address]  [phone]    If symptoms worsen    3.  Return to ED if worse    4.      Medication List        START taking these medications      methocarbamol 750 MG tablet  Commonly known as: Robaxin-750  Take 2 tablets by mouth 3 times daily for 5 days            ASK your doctor about these medications      acetaminophen 500 MG tablet  Commonly known as: TYLENOL  Take 2 tablets by mouth 3 times daily as needed for Pain     fluticasone 50 MCG/ACT nasal spray  Commonly known as: FLONASE  2 sprays by Each Nostril route daily     ibuprofen 600 MG tablet  Commonly known as: ADVIL;MOTRIN  Take 1 tablet by mouth 3 times daily as needed for Pain     labetalol 200 MG tablet  Commonly known as: NORMODYNE               Where to Get Your Medications        These medications were sent to [place], [place] - [address] [place] [address] - P [phone] - F [phone]  [address] [place] [address]      Phone: [phone]   methocarbamol 750 MG tablet          Results, Consults, Medications     Consults:  None   Labs:  No results found for this or any previous visit (from the past 12 hour(s)).  Radiologic Studies:  No orders to display     Medications ordered:  Medications   LORazepam (ATIVAN) tablet 0.5 mg (0.5 mg Oral Given 1/4/24 0032)       Documentation Comments   - I am the first and primary provider for this patient and am the primary provider of record.  - Initial assessment performed. The patients presenting problems have been discussed, and the staff are in agreement with the care plan formulated and outlined with them.  I have encouraged them to ask questions as they arise

## 2024-01-04 NOTE — ED TRIAGE NOTES
C/o bilateral lower back pain, states started after coughing a few days ago, states she \"heard something pop\" and then the pain started states radiates from right hip to left hip posteriorly

## 2025-02-02 ENCOUNTER — HOSPITAL ENCOUNTER (EMERGENCY)
Facility: HOSPITAL | Age: 40
Discharge: HOME | End: 2025-02-02
Attending: EMERGENCY MEDICINE

## 2025-02-02 ENCOUNTER — APPOINTMENT (OUTPATIENT)
Facility: HOSPITAL | Age: 40
End: 2025-02-02

## 2025-02-02 VITALS
WEIGHT: 245 LBS | SYSTOLIC BLOOD PRESSURE: 128 MMHG | OXYGEN SATURATION: 100 % | HEART RATE: 78 BPM | HEIGHT: 65 IN | DIASTOLIC BLOOD PRESSURE: 88 MMHG | TEMPERATURE: 98.6 F | BODY MASS INDEX: 40.82 KG/M2 | RESPIRATION RATE: 15 BRPM

## 2025-02-02 DIAGNOSIS — K04.7 DENTAL INFECTION: ICD-10-CM

## 2025-02-02 DIAGNOSIS — F17.200 SMOKER: ICD-10-CM

## 2025-02-02 DIAGNOSIS — L03.211 FACIAL CELLULITIS: Primary | ICD-10-CM

## 2025-02-02 LAB
ANION GAP BLD CALC-SCNC: 9
BASOPHILS # BLD: 0.07 K/UL (ref 0–0.1)
BASOPHILS NFR BLD: 0.5 % (ref 0–1)
CA-I BLD-MCNC: 1.25 MMOL/L (ref 1.12–1.32)
CHLORIDE BLD-SCNC: 108 MMOL/L (ref 98–107)
CO2 BLD-SCNC: 28 MMOL/L
CREAT UR-MCNC: 0.83 MG/DL (ref 0.6–1.3)
DIFFERENTIAL METHOD BLD: ABNORMAL
EOSINOPHIL # BLD: 0.38 K/UL (ref 0–0.4)
EOSINOPHIL NFR BLD: 2.7 % (ref 0–7)
ERYTHROCYTE [DISTWIDTH] IN BLOOD BY AUTOMATED COUNT: 15.7 % (ref 11.5–14.5)
GLUCOSE BLD STRIP.AUTO-MCNC: 147 MG/DL (ref 65–100)
HCT VFR BLD AUTO: 38.4 % (ref 35–47)
HGB BLD-MCNC: 12.2 G/DL (ref 11.5–16)
IMM GRANULOCYTES # BLD AUTO: 0.05 K/UL (ref 0–0.04)
IMM GRANULOCYTES NFR BLD AUTO: 0.4 % (ref 0–0.5)
LYMPHOCYTES # BLD: 3.59 K/UL (ref 0.8–3.5)
LYMPHOCYTES NFR BLD: 25.6 % (ref 12–49)
MCH RBC QN AUTO: 27.3 PG (ref 26–34)
MCHC RBC AUTO-ENTMCNC: 31.8 G/DL (ref 30–36.5)
MCV RBC AUTO: 85.9 FL (ref 80–99)
MONOCYTES # BLD: 0.82 K/UL (ref 0–1)
MONOCYTES NFR BLD: 5.9 % (ref 5–13)
NEUTS SEG # BLD: 9.1 K/UL (ref 1.8–8)
NEUTS SEG NFR BLD: 64.9 % (ref 32–75)
NRBC # BLD: 0 K/UL (ref 0–0.01)
NRBC BLD-RTO: 0 PER 100 WBC
PLATELET # BLD AUTO: 379 K/UL (ref 150–400)
PMV BLD AUTO: 10.6 FL (ref 8.9–12.9)
POTASSIUM BLD-SCNC: 3.7 MMOL/L (ref 3.5–5.5)
RBC # BLD AUTO: 4.47 M/UL (ref 3.8–5.2)
SODIUM BLD-SCNC: 144 MMOL/L (ref 136–145)
WBC # BLD AUTO: 14 K/UL (ref 3.6–11)

## 2025-02-02 PROCEDURE — 96375 TX/PRO/DX INJ NEW DRUG ADDON: CPT

## 2025-02-02 PROCEDURE — 36415 COLL VENOUS BLD VENIPUNCTURE: CPT

## 2025-02-02 PROCEDURE — 6360000002 HC RX W HCPCS: Performed by: EMERGENCY MEDICINE

## 2025-02-02 PROCEDURE — 80047 BASIC METABLC PNL IONIZED CA: CPT

## 2025-02-02 PROCEDURE — 99285 EMERGENCY DEPT VISIT HI MDM: CPT

## 2025-02-02 PROCEDURE — 85025 COMPLETE CBC W/AUTO DIFF WBC: CPT

## 2025-02-02 PROCEDURE — 6360000004 HC RX CONTRAST MEDICATION: Performed by: EMERGENCY MEDICINE

## 2025-02-02 PROCEDURE — 70487 CT MAXILLOFACIAL W/DYE: CPT

## 2025-02-02 PROCEDURE — 96365 THER/PROPH/DIAG IV INF INIT: CPT

## 2025-02-02 RX ORDER — CLINDAMYCIN HYDROCHLORIDE 300 MG/1
300 CAPSULE ORAL 3 TIMES DAILY
Qty: 30 CAPSULE | Refills: 0 | Status: SHIPPED | OUTPATIENT
Start: 2025-02-02 | End: 2025-02-12

## 2025-02-02 RX ORDER — IOPAMIDOL 755 MG/ML
100 INJECTION, SOLUTION INTRAVASCULAR
Status: COMPLETED | OUTPATIENT
Start: 2025-02-02 | End: 2025-02-02

## 2025-02-02 RX ORDER — CLINDAMYCIN IN PERCENT DEXTROSE 6 MG/ML
300 INJECTION, SOLUTION INTRAVENOUS
Status: COMPLETED | OUTPATIENT
Start: 2025-02-02 | End: 2025-02-02

## 2025-02-02 RX ORDER — KETOROLAC TROMETHAMINE 15 MG/ML
15 INJECTION, SOLUTION INTRAMUSCULAR; INTRAVENOUS
Status: COMPLETED | OUTPATIENT
Start: 2025-02-02 | End: 2025-02-02

## 2025-02-02 RX ADMIN — IOPAMIDOL 100 ML: 755 INJECTION, SOLUTION INTRAVENOUS at 11:10

## 2025-02-02 RX ADMIN — CLINDAMYCIN PHOSPHATE 300 MG: 300 INJECTION, SOLUTION INTRAVENOUS at 11:24

## 2025-02-02 RX ADMIN — KETOROLAC TROMETHAMINE 15 MG: 15 INJECTION, SOLUTION INTRAMUSCULAR; INTRAVENOUS at 11:22

## 2025-02-02 ASSESSMENT — PAIN - FUNCTIONAL ASSESSMENT
PAIN_FUNCTIONAL_ASSESSMENT: NONE - DENIES PAIN
PAIN_FUNCTIONAL_ASSESSMENT: 0-10

## 2025-02-02 ASSESSMENT — PAIN SCALES - GENERAL: PAINLEVEL_OUTOF10: 8

## 2025-02-02 ASSESSMENT — PAIN DESCRIPTION - LOCATION: LOCATION: FACE

## 2025-02-02 NOTE — DISCHARGE INSTRUCTIONS
been provided, please fill it as soon as possible to prevent a delay in treatment. If you have any questions or reservations about taking the medication due to side effects or interactions with other medications, please call your primary care provider or contact us directly.  Again, THANK YOU for choosing us to care for YOU!      [facility]  [address]  [place] [address] [phone]    [facility]  [address] [place] [address] [phone]    [facility]   [address] [facility] [address]   Phone [phone] or [phone]   Hours [id number] (extractions)   Simple tooth extraction: $60 per tooth, $55 per x-ray     Non-Urgent Dental Care Clinics   [facility] at [address] [address]   Dental Clinic: [phone]   Oral Surgery Clinic: [phone]     Emergency Dental Care   [facility]   [address] [address]   Monday, Wednesday, Friday: 8am-5pm   Tuesday and Thursday: 8am-6pm   Phone: [phone]   $70 for Emergency Care   $60 for first routine care, then pay by sliding scale based upon income     [address] [address]   Phone: [phone], select option (2) to confirm time for treatment     [facility]   [address] [place] [address]   Monday-Friday: 8am-4pm   Phone: [phone]     [facility]   [facility], [address]   Phone: [phone] to confirm a time for emergency treatment   Pediatrics: [phone]   $75 per tooth, extractions only

## 2025-02-02 NOTE — ED PROVIDER NOTES
[facility] EMERGENCY DEPT  EMERGENCY DEPARTMENT HISTORY AND PHYSICAL EXAM      Date: 2/2/2025  Patient Name: Kayla Tai  MRN: [id number]  Birthdate 1985  Date of evaluation: 2/2/2025  Provider: Blayne Javed MD   Note Started: 10:34 AM EST 2/2/25    HISTORY OF PRESENT ILLNESS     Chief Complaint   Patient presents with    Abscess       History Provided By: Patient    HPI: Kayla Tai is a 39 y.o. female with a history of anxiety, smoking, dental infections, presenting with facial abscess.  Patient states that about a week ago she started developing dental pain as well as facial swelling.  Was seen at  and put on Augmentin.  Has taken Augmentin for the past 7 days.  However she continues to have swelling of her face and now the swelling has gotten worse this morning with some pain.  Denies any fevers    PAST MEDICAL HISTORY   Past Medical History:  Past Medical History:   Diagnosis Date    Anxiety     Asthma     Bronchitis     Depression     Hypertension     Obesity     Postpartum depression        Past Surgical History:  Past Surgical History:   Procedure Laterality Date    OTHER SURGICAL HISTORY      wisdom teeth, D&C, tonsillectomy    TONSILLECTOMY         Family History:  History reviewed. No pertinent family history.    Social History:  Social History     Tobacco Use    Smoking status: Every Day     Current packs/day: 0.50     Types: Cigarettes    Smokeless tobacco: Never   Substance Use Topics    Alcohol use: Not Currently    Drug use: Yes     Types: Marijuana (Weed)     Comment: daily       Allergies:  Allergies   Allergen Reactions    Sulfa Antibiotics Rash     Other reaction(s): Not Reported This Time       PCP: Jose Baez DO    Current Meds:   Current Facility-Administered Medications   Medication Dose Route Frequency Provider Last Rate Last Admin    clindamycin (CLEOCIN) 300 mg in dextrose 5% 50 mL IVPB  300 mg IntraVENous NOW Blayne Javed  mL/hr at 02/02/25 1124 300 mg at 02/02/25 1124 
98

## 2025-04-04 ENCOUNTER — HOSPITAL ENCOUNTER (EMERGENCY)
Facility: HOSPITAL | Age: 40
Discharge: HOME | End: 2025-04-04

## 2025-04-04 VITALS
OXYGEN SATURATION: 100 % | HEART RATE: 82 BPM | SYSTOLIC BLOOD PRESSURE: 130 MMHG | WEIGHT: 245 LBS | HEIGHT: 65 IN | BODY MASS INDEX: 40.82 KG/M2 | DIASTOLIC BLOOD PRESSURE: 70 MMHG | TEMPERATURE: 98 F | RESPIRATION RATE: 16 BRPM

## 2025-04-04 DIAGNOSIS — J11.1 UPPER RESPIRATORY TRACT INFECTION DUE TO INFLUENZA: Primary | ICD-10-CM

## 2025-04-04 PROCEDURE — 99282 EMERGENCY DEPT VISIT SF MDM: CPT

## 2025-04-04 ASSESSMENT — PAIN DESCRIPTION - PAIN TYPE: TYPE: ACUTE PAIN

## 2025-04-04 ASSESSMENT — PAIN - FUNCTIONAL ASSESSMENT
PAIN_FUNCTIONAL_ASSESSMENT: NONE - DENIES PAIN
PAIN_FUNCTIONAL_ASSESSMENT: 0-10

## 2025-04-04 ASSESSMENT — PAIN SCALES - GENERAL
PAINLEVEL_OUTOF10: 3
PAINLEVEL_OUTOF10: 0

## 2025-04-04 NOTE — ED PROVIDER NOTES
[facility] EMERGENCY DEPT  EMERGENCY DEPARTMENT HISTORY AND PHYSICAL EXAM      Date of evaluation: 4/4/2025  Patient Name: Kayla Tai  Birthdate 1985  MRN: [id number]  ED Provider: Abbe Pulliam PA-C   Note Started: 4:17 PM EDT 4/4/25    HISTORY OF PRESENT ILLNESS     Chief Complaint   Patient presents with    Pharyngitis       History Provided By: Patient, only     HPI: Kayla Tai is a 39 y.o. female past medical show as below presents with complaint of sore throat starting 1 to 2 days ago.  Here with son who has similar complaints.  Denies any fever or chills but department cough congestion.  Also presents with bodyaches as well.  States \"she is not feel like her normal self\".  No abdominal pain nausea vomiting.  No chest pain palpitations.  No shortness of breath or wheezes.  No other complaints    PAST MEDICAL HISTORY   Past Medical History:  Past Medical History:   Diagnosis Date    Anxiety     Asthma     Bronchitis     Depression     Hypertension     Obesity     Postpartum depression        Past Surgical History:  Past Surgical History:   Procedure Laterality Date    OTHER SURGICAL HISTORY      wisdom teeth, D&C, tonsillectomy    TONSILLECTOMY         Family History:  No family history on file.    Social History:  Social History     Tobacco Use    Smoking status: Every Day     Current packs/day: 0.50     Types: Cigarettes    Smokeless tobacco: Never   Substance Use Topics    Alcohol use: Not Currently    Drug use: Yes     Types: Marijuana (Weed)     Comment: daily       Allergies:  Allergies   Allergen Reactions    Sulfa Antibiotics Rash     Other reaction(s): Not Reported This Time       PCP: No, Pcp    Current Meds:   No current facility-administered medications for this encounter.     Current Outpatient Medications   Medication Sig Dispense Refill    acetaminophen (TYLENOL) 500 MG tablet Take 2 tablets by mouth 3 times daily as needed for Pain 30 tablet 0    ibuprofen (ADVIL;MOTRIN) 600 MG tablet Take 1  liver, or kidney disease   · Morbid obesity (BMI >=40)     Records Reviewed: Prior medical records and Nursing notes. See ED Course for summary.   Vitals:    Vitals:    04/04/25 1038 04/04/25 1039 04/04/25 1301   BP: 135/79  130/70   Pulse: 88  82   Resp: 17  16   Temp: 98.1 °F (36.7 °C)  98 °F (36.7 °C)   TempSrc:   Oral   SpO2: 99%  100%   Weight:  111.1 kg (245 lb)    Height:  1.651 m (5' 5\")         ED COURSE  ED Course as of 04/04/25 1620   Fri Apr 04, 2025   1258 Mother has sore throat congestion.  Here with son with similar complaints. [JT]      ED Course User Index  [JT] Abbe Pulliam PA-C       Clinical Management Tools:  Not Applicable    Smoking Cessation: Not Applicable    Patient was given the following medications:  Medications - No data to display    CONSULTS: See ED Course/MDM for further details.  None   PROCEDURES   Unless otherwise noted above, none  Procedures    SEPSIS REASSESSMENT & CRITICAL CARE TIME   SEPSIS REASSESSMENT: Patient does NOT meet Sepsis criteria after ED workup    Patient does not meet Critical Care Time, 0 minutes  CLINICAL IMPRESSIONS     1. Upper respiratory tract infection due to influenza       SDOH/DISPOSITION/PLAN   Social Determinants affecting Treatment Plan: None    DISPOSITION Decision To Discharge 04/04/2025 12:49:52 PM   DISPOSITION CONDITION Stable         Discharge Note: The patient is stable for discharge home. The signs, symptoms, diagnosis, and discharge instructions have been discussed, understanding conveyed, and agreed upon. The patient is to follow up as recommended or return to ER should their symptoms worsen.      PATIENT REFERRED TO:  Jorge Shepherd, LOYDA  [address]  [address]  [facility] [address] [phone]    Schedule an appointment as soon as possible for a visit       [facility] Emergency Department  [address] [place] [address]  [phone]    If symptoms worsen        DISCHARGE MEDICATIONS:

## 2025-04-04 NOTE — ED TRIAGE NOTES
GCS 15 pt stated that she started to have a sore throat last night and pt is not feeling her usual self

## 2025-04-26 ENCOUNTER — HOSPITAL ENCOUNTER (EMERGENCY)
Facility: HOSPITAL | Age: 40
Discharge: HOME | End: 2025-04-26

## 2025-04-26 VITALS
RESPIRATION RATE: 16 BRPM | HEART RATE: 82 BPM | DIASTOLIC BLOOD PRESSURE: 82 MMHG | SYSTOLIC BLOOD PRESSURE: 139 MMHG | TEMPERATURE: 98.6 F | OXYGEN SATURATION: 98 %

## 2025-04-26 DIAGNOSIS — J02.9 SORE THROAT: Primary | ICD-10-CM

## 2025-04-26 DIAGNOSIS — J02.9 VIRAL PHARYNGITIS: ICD-10-CM

## 2025-04-26 LAB
FLUAV RNA SPEC QL NAA+PROBE: NOT DETECTED
FLUBV RNA SPEC QL NAA+PROBE: NOT DETECTED
S PYO DNA THROAT QL NAA+PROBE: NOT DETECTED
SARS-COV-2 RNA RESP QL NAA+PROBE: NOT DETECTED

## 2025-04-26 PROCEDURE — 99283 EMERGENCY DEPT VISIT LOW MDM: CPT

## 2025-04-26 PROCEDURE — 87651 STREP A DNA AMP PROBE: CPT

## 2025-04-26 PROCEDURE — 87636 SARSCOV2 & INF A&B AMP PRB: CPT

## 2025-04-26 PROCEDURE — 6370000000 HC RX 637 (ALT 250 FOR IP)

## 2025-04-26 RX ORDER — IBUPROFEN 200 MG
600 TABLET ORAL
Status: COMPLETED | OUTPATIENT
Start: 2025-04-26 | End: 2025-04-26

## 2025-04-26 RX ORDER — ACETAMINOPHEN 500 MG
1000 TABLET ORAL
Status: COMPLETED | OUTPATIENT
Start: 2025-04-26 | End: 2025-04-26

## 2025-04-26 RX ADMIN — ACETAMINOPHEN 1000 MG: 500 TABLET ORAL at 15:26

## 2025-04-26 RX ADMIN — IBUPROFEN 600 MG: 200 TABLET, FILM COATED ORAL at 15:25

## 2025-04-26 ASSESSMENT — PAIN - FUNCTIONAL ASSESSMENT
PAIN_FUNCTIONAL_ASSESSMENT: NONE - DENIES PAIN
PAIN_FUNCTIONAL_ASSESSMENT: NONE - DENIES PAIN

## 2025-04-26 ASSESSMENT — PAIN SCALES - GENERAL
PAINLEVEL_OUTOF10: 5
PAINLEVEL_OUTOF10: 5
PAINLEVEL_OUTOF10: 2

## 2025-04-26 ASSESSMENT — LIFESTYLE VARIABLES
HOW MANY STANDARD DRINKS CONTAINING ALCOHOL DO YOU HAVE ON A TYPICAL DAY: 1 OR 2
HOW OFTEN DO YOU HAVE A DRINK CONTAINING ALCOHOL: MONTHLY OR LESS

## 2025-04-26 ASSESSMENT — PAIN DESCRIPTION - LOCATION
LOCATION: HEAD;NECK
LOCATION: HEAD;NECK
LOCATION: NECK;HEAD

## 2025-04-26 ASSESSMENT — PAIN DESCRIPTION - PAIN TYPE: TYPE: ACUTE PAIN

## 2025-04-26 ASSESSMENT — PAIN DESCRIPTION - DESCRIPTORS: DESCRIPTORS: SHARP

## 2025-04-26 NOTE — DISCHARGE INSTRUCTIONS
Thank you for choosing our Emergency Department for your care.  It is our privilege to care for you in your time of need.  In the next several days, you may receive a survey via email or mailed to your home about your experience with our team.  We would greatly appreciate you taking a few minutes to complete the survey, as we use this information to learn what we have done well and what we could be doing better. Thank you for trusting us with your care!    Below you will find a list of your tests from today's visit.   Labs and Radiology Studies  Recent Results (from the past 12 hours)   Group A Strep by PCR    Collection Time: 04/26/25  3:00 PM    Specimen: Swab; Throat   Result Value Ref Range    Strep Grp A PCR Not Detected Not Detected     COVID-19 & Influenza Combo    Collection Time: 04/26/25  3:00 PM    Specimen: Nasopharyngeal   Result Value Ref Range    SARS-CoV-2, PCR Not Detected Not Detected      Rapid Influenza A By PCR Not Detected Not Detected      Rapid Influenza B By PCR Not Detected Not Detected       No results found.  ------------------------------------------------------------------------------------------------------------  The evaluation and treatment you received in the Emergency Department were for an urgent problem. It is important that you follow-up with a doctor, nurse practitioner, or physician assistant to:  (1) confirm your diagnosis,  (2) re-evaluation of changes in your illness and treatment, and (3) for ongoing care. Please take your discharge instructions with you when you go to your follow-up appointment.     If you have any problem arranging a follow-up appointment, contact us!  If your symptoms become worse or you do not improve as expected, please return to us. We are available 24 hours a day.     If a prescription has been provided, please fill it as soon as possible to prevent a delay in treatment. If you have any questions or reservations about taking the medication due  to side effects or interactions with other medications, please call your primary care provider or contact us directly.  Again, THANK YOU for choosing us to care for YOU!

## 2025-04-26 NOTE — ED PROVIDER NOTES
[facility] EMERGENCY DEPT  EMERGENCY DEPARTMENT HISTORY AND PHYSICAL EXAM      Date of evaluation: 4/26/2025  Patient Name: Kayla Tai  Birthdate 1985  MRN: [id number]  ED Provider: Dg Stevenson PA-C   Note Started: 3:14 PM EDT 4/26/25    HISTORY OF PRESENT ILLNESS     Chief Complaint   Patient presents with    Pharyngitis       History Provided By: Patient, only     HPI: Kayla Tai is a 39 y.o. female with past medical history as listed below presents emergency department for evaluation of sore throat, subjective fevers chills, productive cough.  Reports that she has been exposed to both flu and strep in the last week reports that her symptoms started 2 days ago.  Has not treated with any over-the-counter or home remedies.  Denies any chest pain shortness of breath difficulty breathing abdominal pain nausea vomiting or diarrhea.    PAST MEDICAL HISTORY   Past Medical History:  Past Medical History:   Diagnosis Date    Anxiety     Asthma     Bronchitis     Depression     Hypertension     Obesity     Postpartum depression        Past Surgical History:  Past Surgical History:   Procedure Laterality Date    OTHER SURGICAL HISTORY      wisdom teeth, D&C, tonsillectomy    TONSILLECTOMY         Family History:  No family history on file.    Social History:  Social History     Tobacco Use    Smoking status: Every Day     Current packs/day: 0.50     Types: Cigarettes    Smokeless tobacco: Never   Substance Use Topics    Alcohol use: Not Currently    Drug use: Yes     Types: Marijuana (Weed)     Comment: daily       Allergies:  Allergies   Allergen Reactions    Sulfa Antibiotics Rash     Other reaction(s): Not Reported This Time       PCP: No, Pcp    Current Meds:   No current facility-administered medications for this encounter.     Current Outpatient Medications   Medication Sig Dispense Refill    Phenol-Glycerin (CHLORASEPTIC MAX) 1.5-33 % spray Take 1 spray by mouth as needed for Sore Throat 30 mL 0           Clinical Management Tools:  Not Applicable    Smoking Cessation: Not Applicable    Patient was given the following medications:  Medications   acetaminophen (TYLENOL) tablet 1,000 mg (1,000 mg Oral Given 4/26/25 1526)   ibuprofen (ADVIL;MOTRIN) tablet 600 mg (600 mg Oral Given 4/26/25 1525)       CONSULTS: See ED Course/MDM for further details.  None   PROCEDURES   Unless otherwise noted above, none  Procedures    SEPSIS REASSESSMENT & CRITICAL CARE TIME   SEPSIS REASSESSMENT: Patient does NOT meet Sepsis criteria after ED workup    Patient does not meet Critical Care Time, 0 minutes  CLINICAL IMPRESSIONS     1. Sore throat    2. Viral pharyngitis       SDOH/DISPOSITION/PLAN   Social Determinants affecting Treatment Plan: None    DISPOSITION Decision To Discharge 04/26/2025 04:04:22 PM   DISPOSITION CONDITION Stable         Discharge Note: The patient is stable for discharge home. The signs, symptoms, diagnosis, and discharge instructions have been discussed, understanding conveyed, and agreed upon. The patient is to follow up as recommended or return to ER should their symptoms worsen.      PATIENT REFERRED TO:  No follow-up provider specified.      DISCHARGE MEDICATIONS:     Medication List        START taking these medications      Phenol-Glycerin 1.5-33 % spray  Commonly known as: CHLORASEPTIC MAX  Take 1 spray by mouth as needed for Sore Throat            ASK your doctor about these medications      acetaminophen 500 MG tablet  Commonly known as: TYLENOL  Take 2 tablets by mouth 3 times daily as needed for Pain     fluticasone 50 MCG/ACT nasal spray  Commonly known as: FLONASE  2 sprays by Each Nostril route daily     ibuprofen 600 MG tablet  Commonly known as: ADVIL;MOTRIN  Take 1 tablet by mouth 3 times daily as needed for Pain     labetalol 200 MG tablet  Commonly known as: NORMODYNE               Where to Get Your Medications        These medications were sent to [id number] - [place], [place] - [address]

## 2025-09-06 ENCOUNTER — APPOINTMENT (OUTPATIENT)
Facility: HOSPITAL | Age: 40
End: 2025-09-06

## 2025-09-06 ENCOUNTER — HOSPITAL ENCOUNTER (EMERGENCY)
Facility: HOSPITAL | Age: 40
Discharge: HOME | End: 2025-09-06
Attending: EMERGENCY MEDICINE

## 2025-09-06 VITALS
DIASTOLIC BLOOD PRESSURE: 56 MMHG | TEMPERATURE: 98.2 F | SYSTOLIC BLOOD PRESSURE: 118 MMHG | RESPIRATION RATE: 22 BRPM | HEART RATE: 80 BPM | WEIGHT: 255 LBS | HEIGHT: 65 IN | OXYGEN SATURATION: 97 % | BODY MASS INDEX: 42.49 KG/M2

## 2025-09-06 DIAGNOSIS — R07.89 CHEST WALL PAIN: Primary | ICD-10-CM

## 2025-09-06 LAB
ALBUMIN SERPL-MCNC: 3.3 G/DL (ref 3.5–5)
ALBUMIN/GLOB SERPL: 0.9 (ref 1.1–2.2)
ALP SERPL-CCNC: 96 U/L (ref 45–117)
ALT SERPL-CCNC: 19 U/L (ref 12–78)
ANION GAP SERPL CALC-SCNC: 4 MMOL/L (ref 2–12)
AST SERPL W P-5'-P-CCNC: 10 U/L (ref 15–37)
BASOPHILS # BLD: 0.08 K/UL (ref 0–0.1)
BASOPHILS NFR BLD: 0.6 % (ref 0–1)
BILIRUB SERPL-MCNC: 0.2 MG/DL (ref 0.2–1)
BUN SERPL-MCNC: 10 MG/DL (ref 6–20)
BUN/CREAT SERPL: 11 (ref 12–20)
CA-I BLD-MCNC: 9.6 MG/DL (ref 8.5–10.1)
CHLORIDE SERPL-SCNC: 110 MMOL/L (ref 97–108)
CO2 SERPL-SCNC: 26 MMOL/L (ref 21–32)
CREAT SERPL-MCNC: 0.87 MG/DL (ref 0.55–1.02)
D DIMER PPP FEU-MCNC: 0.33 UG/ML(FEU)
DIFFERENTIAL METHOD BLD: ABNORMAL
EOSINOPHIL # BLD: 0.53 K/UL (ref 0–0.4)
EOSINOPHIL NFR BLD: 3.9 % (ref 0–7)
ERYTHROCYTE [DISTWIDTH] IN BLOOD BY AUTOMATED COUNT: 14 % (ref 11.5–14.5)
GLOBULIN SER CALC-MCNC: 3.8 G/DL (ref 2–4)
GLUCOSE SERPL-MCNC: 102 MG/DL (ref 65–100)
HCG SERPL-ACNC: <1 MIU/ML (ref 0–6)
HCT VFR BLD AUTO: 38.1 % (ref 35–47)
HGB BLD-MCNC: 12.6 G/DL (ref 11.5–16)
IMM GRANULOCYTES # BLD AUTO: 0.05 K/UL (ref 0–0.04)
IMM GRANULOCYTES NFR BLD AUTO: 0.4 % (ref 0–0.5)
LYMPHOCYTES # BLD: 4.09 K/UL (ref 0.8–3.5)
LYMPHOCYTES NFR BLD: 29.7 % (ref 12–49)
MCH RBC QN AUTO: 28.8 PG (ref 26–34)
MCHC RBC AUTO-ENTMCNC: 33.1 G/DL (ref 30–36.5)
MCV RBC AUTO: 87 FL (ref 80–99)
MONOCYTES # BLD: 1.1 K/UL (ref 0–1)
MONOCYTES NFR BLD: 8 % (ref 5–13)
NEUTS SEG # BLD: 7.91 K/UL (ref 1.8–8)
NEUTS SEG NFR BLD: 57.4 % (ref 32–75)
NRBC # BLD: 0 K/UL (ref 0–0.01)
NRBC BLD-RTO: 0 PER 100 WBC
PLATELET # BLD AUTO: 314 K/UL (ref 150–400)
PMV BLD AUTO: 11.2 FL (ref 8.9–12.9)
POTASSIUM SERPL-SCNC: 4.1 MMOL/L (ref 3.5–5.1)
PROT SERPL-MCNC: 7.1 G/DL (ref 6.4–8.2)
RBC # BLD AUTO: 4.38 M/UL (ref 3.8–5.2)
SODIUM SERPL-SCNC: 140 MMOL/L (ref 136–145)
TROPONIN I SERPL HS-MCNC: <4 NG/L (ref 0–51)
WBC # BLD AUTO: 13.8 K/UL (ref 3.6–11)

## 2025-09-06 PROCEDURE — 36415 COLL VENOUS BLD VENIPUNCTURE: CPT

## 2025-09-06 PROCEDURE — 6360000002 HC RX W HCPCS: Performed by: EMERGENCY MEDICINE

## 2025-09-06 PROCEDURE — 84702 CHORIONIC GONADOTROPIN TEST: CPT

## 2025-09-06 PROCEDURE — 71045 X-RAY EXAM CHEST 1 VIEW: CPT

## 2025-09-06 PROCEDURE — 80053 COMPREHEN METABOLIC PANEL: CPT

## 2025-09-06 PROCEDURE — 85379 FIBRIN DEGRADATION QUANT: CPT

## 2025-09-06 PROCEDURE — 85025 COMPLETE CBC W/AUTO DIFF WBC: CPT

## 2025-09-06 PROCEDURE — 84484 ASSAY OF TROPONIN QUANT: CPT

## 2025-09-06 RX ORDER — KETOROLAC TROMETHAMINE 30 MG/ML
30 INJECTION, SOLUTION INTRAMUSCULAR; INTRAVENOUS ONCE
Status: COMPLETED | OUTPATIENT
Start: 2025-09-06 | End: 2025-09-06

## 2025-09-06 RX ORDER — KETOROLAC TROMETHAMINE 10 MG/1
10 TABLET, FILM COATED ORAL EVERY 6 HOURS PRN
Qty: 20 TABLET | Refills: 0 | Status: SHIPPED | OUTPATIENT
Start: 2025-09-06

## 2025-09-06 RX ORDER — METHOCARBAMOL 750 MG/1
750 TABLET, FILM COATED ORAL 4 TIMES DAILY
Qty: 40 TABLET | Refills: 0 | Status: SHIPPED | OUTPATIENT
Start: 2025-09-06 | End: 2025-09-16

## 2025-09-06 RX ADMIN — KETOROLAC TROMETHAMINE 30 MG: 30 INJECTION, SOLUTION INTRAMUSCULAR at 20:27

## 2025-09-06 ASSESSMENT — PAIN DESCRIPTION - LOCATION: LOCATION: CHEST

## 2025-09-06 ASSESSMENT — PAIN SCALES - GENERAL
PAINLEVEL_OUTOF10: 6
PAINLEVEL_OUTOF10: 3

## 2025-09-06 ASSESSMENT — PAIN - FUNCTIONAL ASSESSMENT: PAIN_FUNCTIONAL_ASSESSMENT: 0-10

## 2025-09-07 LAB
EKG ATRIAL RATE: 95 BPM
EKG DIAGNOSIS: NORMAL
EKG P AXIS: 49 DEGREES
EKG P-R INTERVAL: 150 MS
EKG Q-T INTERVAL: 352 MS
EKG QRS DURATION: 84 MS
EKG QTC CALCULATION (BAZETT): 442 MS
EKG R AXIS: 60 DEGREES
EKG T AXIS: 57 DEGREES
EKG VENTRICULAR RATE: 95 BPM

## (undated) DEVICE — HANDLE LT SNAP ON ULT DURABLE LENS FOR TRUMPF ALC DISPOSABLE

## (undated) DEVICE — SUTURE PLN GUT SZ 2-0 L27IN ABSRB YELLOWISH TAN L40MM CT 853H

## (undated) DEVICE — SUTURE VCRL SZ 2-0 L27IN ABSRB VLT L40MM CT 1/2 CIR J351H

## (undated) DEVICE — TIP CLEANER: Brand: VALLEYLAB

## (undated) DEVICE — STERILE POLYISOPRENE POWDER-FREE SURGICAL GLOVES: Brand: PROTEXIS

## (undated) DEVICE — SUTURE VCRL SZ 3-0 L36IN ABSRB VLT CT L40MM 1/2 CIR J356H

## (undated) DEVICE — STRIP,CLOSURE,WOUND,MEDI-STRIP,1/2X4: Brand: MEDLINE

## (undated) DEVICE — STERILE POLYISOPRENE POWDER-FREE SURGICAL GLOVES WITH EMOLLIENT COATING: Brand: PROTEXIS

## (undated) DEVICE — BLADE ASSEMB CLP HAIR FINE --

## (undated) DEVICE — GARMENT,MEDLINE,DVT,INT,CALF,MED, GEN2: Brand: MEDLINE

## (undated) DEVICE — PICO 7 10CM X 30CM: Brand: PICO™ 7

## (undated) DEVICE — PAD,ABDOMINAL,5"X9",ST,LF,25/BX: Brand: MEDLINE INDUSTRIES, INC.

## (undated) DEVICE — C-SECTION II-LF: Brand: MEDLINE INDUSTRIES, INC.

## (undated) DEVICE — SOLUTION IV 1000ML 0.9% SOD CHL

## (undated) DEVICE — SUTURE VCRL SZ 0 L36IN ABSRB VLT L40MM CT 1/2 CIR J358H

## (undated) DEVICE — REM POLYHESIVE ADULT PATIENT RETURN ELECTRODE: Brand: VALLEYLAB

## (undated) DEVICE — TRAY,URINE METER,100% SILICONE,16FR10ML: Brand: MEDLINE

## (undated) DEVICE — SYRINGE IRRIG 60ML SFT PLIABLE BLB EZ TO GRP 1 HND USE W/

## (undated) DEVICE — SUTURE ABSRB BRAID COAT UD CT NO 1 36IN VCRL J959H

## (undated) DEVICE — TOWEL,OR,DSP,ST,BLUE,STD,2/PK,40PK/CS: Brand: MEDLINE

## (undated) DEVICE — 3M™ MEDIPORE™ H SOFT CLOTH SURGICAL TAPE, 2863, 3 IN X 10 YD, 12/CASE: Brand: 3M™ MEDIPORE™

## (undated) DEVICE — (D)PREP SKN CHLRAPRP APPL 26ML -- CONVERT TO ITEM 371833

## (undated) DEVICE — SUTURE MCRYL SZ 4 0 L18IN ABSRB VLT PS 1 L24MM 3 8 CIR REV Y682H

## (undated) DEVICE — 3000CC GUARDIAN II: Brand: GUARDIAN

## (undated) DEVICE — ROCKER SWITCH PENCIL HOLSTER: Brand: VALLEYLAB

## (undated) DEVICE — SOLIDIFIER FLUID 3000 CC ABSORB

## (undated) DEVICE — MASTISOL ADHESIVE LIQ 2/3ML

## (undated) DEVICE — SUTURE MCRYL SZ 0 L36IN ABSRB UD L36MM CT-1 1/2 CIR Y946H